# Patient Record
Sex: MALE | Race: WHITE | NOT HISPANIC OR LATINO | Employment: OTHER | URBAN - METROPOLITAN AREA
[De-identification: names, ages, dates, MRNs, and addresses within clinical notes are randomized per-mention and may not be internally consistent; named-entity substitution may affect disease eponyms.]

---

## 2020-10-07 LAB — CRC RECOMMENDATION EXT: NORMAL

## 2023-01-18 ENCOUNTER — OFFICE VISIT (OUTPATIENT)
Dept: PRIMARY CARE CLINIC | Facility: CLINIC | Age: 73
End: 2023-01-18
Payer: MEDICARE

## 2023-01-18 VITALS
HEIGHT: 69 IN | BODY MASS INDEX: 38.51 KG/M2 | WEIGHT: 260 LBS | OXYGEN SATURATION: 95 % | TEMPERATURE: 98 F | RESPIRATION RATE: 18 BRPM

## 2023-01-18 DIAGNOSIS — N40.0 BENIGN PROSTATIC HYPERPLASIA, UNSPECIFIED WHETHER LOWER URINARY TRACT SYMPTOMS PRESENT: ICD-10-CM

## 2023-01-18 DIAGNOSIS — I10 ESSENTIAL HYPERTENSION, BENIGN: ICD-10-CM

## 2023-01-18 DIAGNOSIS — J40 BRONCHITIS: Primary | ICD-10-CM

## 2023-01-18 DIAGNOSIS — J20.8 VIRAL BRONCHITIS: ICD-10-CM

## 2023-01-18 DIAGNOSIS — E78.2 MIXED HYPERLIPIDEMIA: ICD-10-CM

## 2023-01-18 DIAGNOSIS — R73.9 HIGH BLOOD SUGAR: ICD-10-CM

## 2023-01-18 PROCEDURE — 99214 PR OFFICE/OUTPT VISIT, EST, LEVL IV, 30-39 MIN: ICD-10-PCS | Mod: 25,S$GLB,, | Performed by: INTERNAL MEDICINE

## 2023-01-18 PROCEDURE — 96372 THER/PROPH/DIAG INJ SC/IM: CPT | Mod: S$GLB,,, | Performed by: INTERNAL MEDICINE

## 2023-01-18 PROCEDURE — 99214 OFFICE O/P EST MOD 30 MIN: CPT | Mod: 25,S$GLB,, | Performed by: INTERNAL MEDICINE

## 2023-01-18 PROCEDURE — 96372 PR INJECTION,THERAP/PROPH/DIAG2ST, IM OR SUBCUT: ICD-10-PCS | Mod: S$GLB,,, | Performed by: INTERNAL MEDICINE

## 2023-01-18 RX ORDER — METHYLPREDNISOLONE 4 MG/1
TABLET ORAL
Qty: 21 EACH | Refills: 0 | Status: SHIPPED | OUTPATIENT
Start: 2023-01-18 | End: 2023-02-08

## 2023-01-18 RX ORDER — ALBUTEROL SULFATE 90 UG/1
2 AEROSOL, METERED RESPIRATORY (INHALATION) EVERY 6 HOURS PRN
Qty: 18 G | Refills: 0 | Status: SHIPPED | OUTPATIENT
Start: 2023-01-18 | End: 2023-08-01

## 2023-01-18 RX ORDER — BENZONATATE 100 MG/1
100 CAPSULE ORAL 3 TIMES DAILY PRN
Qty: 30 CAPSULE | Refills: 1 | Status: SHIPPED | OUTPATIENT
Start: 2023-01-18 | End: 2023-02-07

## 2023-01-18 NOTE — PROGRESS NOTES
Subjective:       Patient ID: Neil Chatman is a 72 y.o. male.    Chief Complaint: Establish Care and Bronchitis (2 days)      URI   This is a recurrent problem. The current episode started in the past 7 days. The problem has been unchanged. There has been no fever. Associated symptoms include rhinorrhea, a sore throat and wheezing. He has tried nothing for the symptoms.   Review of Systems   HENT:  Positive for rhinorrhea and sore throat.    Respiratory:  Positive for wheezing.        Objective:      Physical Exam  Constitutional:       Appearance: Normal appearance. He is obese.   Cardiovascular:      Rate and Rhythm: Normal rate and regular rhythm.      Pulses: Normal pulses.      Heart sounds: Normal heart sounds. No murmur heard.    No friction rub. No gallop.   Pulmonary:      Effort: Pulmonary effort is normal.      Breath sounds: Wheezing present.      Comments: Dec air entry  Abdominal:      General: Abdomen is flat. Bowel sounds are normal.      Palpations: Abdomen is soft.   Musculoskeletal:         General: Normal range of motion.   Lymphadenopathy:      Cervical: No cervical adenopathy.   Skin:     General: Skin is warm and dry.   Neurological:      General: No focal deficit present.      Mental Status: He is alert and oriented to person, place, and time.   Psychiatric:         Mood and Affect: Mood normal.       Assessment:       1. Bronchitis  -     methylPREDNISolone sod suc(PF) injection 125 mg    2. Viral bronchitis  Overview:  SUBJECTIVE:   Neil Chatman is a 72 y.o. male who complains of sore throat for 2 days. He denies a history of anorexia, chest pain, chills, dizziness and fatigue and denies a history of asthma. Patient denies smoke cigarettes.     OBJECTIVE:  He appears well, vital signs are as noted. Ears normal.  Throat and pharynx normal.  Neck supple. No adenopathy in the neck. Nose is congested. Sinuses non tender. The chest is clear, without wheezes or rales.    ASSESSMENT:   viral upper  respiratory illness    PLAN:  Symptomatic therapy suggested: push fluids, rest, gargle warm salt water, use vaporizer or mist prn and return office visit prn if symptoms persist or worsen                                                    . Lack of antibiotic effectiveness discussed with him. Call or return to clinic prn if these symptoms worsen or fail to improve as anticipated.        Other orders  -     albuterol (VENTOLIN HFA) 90 mcg/actuation inhaler; Inhale 2 puffs into the lungs every 6 (six) hours as needed for Wheezing.  Dispense: 18 g; Refill: 0  -     benzonatate (TESSALON) 100 MG capsule; Take 1 capsule (100 mg total) by mouth 3 (three) times daily as needed for Cough.  Dispense: 30 capsule; Refill: 1  -     methylPREDNISolone (MEDROL DOSEPACK) 4 mg tablet; use as directed  Dispense: 21 each; Refill: 0             Plan:       1. Bronchitis  -     methylPREDNISolone sod suc(PF) injection 125 mg    2. Viral bronchitis  Overview:  SUBJECTIVE:   Neil Chatman is a 72 y.o. male who complains of sore throat for 2 days. He denies a history of anorexia, chest pain, chills, dizziness and fatigue and denies a history of asthma. Patient denies smoke cigarettes.     OBJECTIVE:  He appears well, vital signs are as noted. Ears normal.  Throat and pharynx normal.  Neck supple. No adenopathy in the neck. Nose is congested. Sinuses non tender. The chest is clear, without wheezes or rales.    ASSESSMENT:   viral upper respiratory illness    PLAN:  Symptomatic therapy suggested: push fluids, rest, gargle warm salt water, use vaporizer or mist prn and return office visit prn if symptoms persist or worsen                                                    . Lack of antibiotic effectiveness discussed with him. Call or return to clinic prn if these symptoms worsen or fail to improve as anticipated.        Other orders  -     albuterol (VENTOLIN HFA) 90 mcg/actuation inhaler; Inhale 2 puffs into the lungs every 6 (six) hours as  needed for Wheezing.  Dispense: 18 g; Refill: 0  -     benzonatate (TESSALON) 100 MG capsule; Take 1 capsule (100 mg total) by mouth 3 (three) times daily as needed for Cough.  Dispense: 30 capsule; Refill: 1  -     methylPREDNISolone (MEDROL DOSEPACK) 4 mg tablet; use as directed  Dispense: 21 each; Refill: 0

## 2023-01-19 ENCOUNTER — TELEPHONE (OUTPATIENT)
Dept: FAMILY MEDICINE | Facility: CLINIC | Age: 73
End: 2023-01-19

## 2023-01-19 DIAGNOSIS — N40.0 BENIGN PROSTATIC HYPERPLASIA, UNSPECIFIED WHETHER LOWER URINARY TRACT SYMPTOMS PRESENT: Primary | ICD-10-CM

## 2023-01-19 RX ORDER — ASPIRIN 81 MG/1
TABLET ORAL
COMMUNITY
End: 2024-04-02

## 2023-01-19 RX ORDER — ZINC GLUCONATE 50 MG
50 TABLET ORAL
COMMUNITY
End: 2024-04-02

## 2023-01-19 RX ORDER — SOTALOL HYDROCHLORIDE 80 MG/1
TABLET ORAL
COMMUNITY
End: 2023-04-28 | Stop reason: SDUPTHER

## 2023-01-19 RX ORDER — GABAPENTIN 300 MG/1
CAPSULE ORAL
COMMUNITY
End: 2023-06-05 | Stop reason: SDUPTHER

## 2023-01-19 RX ORDER — VENLAFAXINE HYDROCHLORIDE 150 MG/1
CAPSULE, EXTENDED RELEASE ORAL
COMMUNITY
End: 2023-03-20 | Stop reason: SDUPTHER

## 2023-01-19 RX ORDER — FUROSEMIDE 40 MG/1
TABLET ORAL
COMMUNITY
End: 2024-04-02

## 2023-01-19 RX ORDER — DIAZEPAM 10 MG/1
TABLET ORAL
COMMUNITY
Start: 2022-10-06 | End: 2023-03-20 | Stop reason: SDUPTHER

## 2023-01-19 RX ORDER — POLYETHYLENE GLYCOL 3350, SODIUM CHLORIDE, SODIUM BICARBONATE, POTASSIUM CHLORIDE 420; 11.2; 5.72; 1.48 G/4L; G/4L; G/4L; G/4L
POWDER, FOR SOLUTION ORAL
COMMUNITY

## 2023-01-19 RX ORDER — SODIUM CHLORIDE 0.9 G/100ML
IRRIGANT IRRIGATION
COMMUNITY
End: 2024-04-02

## 2023-01-19 RX ORDER — ACETAMINOPHEN 500 MG
2000 TABLET ORAL
COMMUNITY

## 2023-01-19 RX ORDER — CYCLOSPORINE 0.5 MG/ML
EMULSION OPHTHALMIC
COMMUNITY
End: 2024-04-02

## 2023-01-19 RX ORDER — SULFAMETHOXAZOLE AND TRIMETHOPRIM 800; 160 MG/1; MG/1
TABLET ORAL
COMMUNITY
End: 2024-04-02

## 2023-01-19 RX ORDER — SIMVASTATIN 40 MG/1
TABLET, FILM COATED ORAL
COMMUNITY
End: 2023-02-16 | Stop reason: SDUPTHER

## 2023-01-19 RX ORDER — OMEPRAZOLE 20 MG/1
CAPSULE, DELAYED RELEASE ORAL
COMMUNITY
End: 2023-04-05 | Stop reason: SDUPTHER

## 2023-01-19 RX ORDER — LISINOPRIL 5 MG/1
TABLET ORAL
COMMUNITY
End: 2023-03-02 | Stop reason: SDUPTHER

## 2023-01-19 NOTE — TELEPHONE ENCOUNTER
Leoncio Napier,    I scheduled the appointments for this patient as requested, he is asking can a PSA be added to his lab orders. Also, Mr. Chatman told me to tell you thank you for seeing him on yesterday and that he feels a lot better.  Signed:  Eileen Martinez LPN

## 2023-01-24 PROCEDURE — G0180 MD CERTIFICATION HHA PATIENT: HCPCS | Mod: ,,, | Performed by: INTERNAL MEDICINE

## 2023-01-24 PROCEDURE — G0180 PR HOME HEALTH MD CERTIFICATION: ICD-10-PCS | Mod: ,,, | Performed by: INTERNAL MEDICINE

## 2023-02-16 RX ORDER — SIMVASTATIN 40 MG/1
40 TABLET, FILM COATED ORAL NIGHTLY
Qty: 90 TABLET | Refills: 3 | Status: SHIPPED | OUTPATIENT
Start: 2023-02-16 | End: 2024-04-02 | Stop reason: CLARIF

## 2023-03-02 ENCOUNTER — EXTERNAL HOME HEALTH (OUTPATIENT)
Dept: HOME HEALTH SERVICES | Facility: HOSPITAL | Age: 73
End: 2023-03-02
Payer: MEDICARE

## 2023-03-02 RX ORDER — LISINOPRIL 5 MG/1
5 TABLET ORAL DAILY
Qty: 90 TABLET | Refills: 3 | Status: SHIPPED | OUTPATIENT
Start: 2023-03-02 | End: 2023-04-28 | Stop reason: SDUPTHER

## 2023-03-02 NOTE — TELEPHONE ENCOUNTER
----- Message from Shanice Rosenberg sent at 3/2/2023  9:47 AM CST -----  Contact: pt  Type:  RX Refill Request    Who Called:  pt  Refill or New Rx:  refill  RX Name and Strength:  requesting a multiple refills please    1. lisinopriL (PRINIVIL,ZESTRIL) 5 MG tablet     --  Sig: lisinopril 5 mg tablet      How is the patient currently taking it? (ex. 1XDay):  as order  Is this a 30 day or 90 day RX:  as order  Preferred Pharmacy with phone number:    [id number] - [place], [place] - [address] AT [facility] [address]  [address]  [place] [address]  Phone: [phone] Fax: [phone]      Local or Mail Order:  local  Ordering Provider:  jailene Kohler Call Back Number:  [phone]  Additional Information:

## 2023-03-15 ENCOUNTER — DOCUMENT SCAN (OUTPATIENT)
Dept: HOME HEALTH SERVICES | Facility: HOSPITAL | Age: 73
End: 2023-03-15
Payer: MEDICARE

## 2023-03-17 ENCOUNTER — LAB VISIT (OUTPATIENT)
Dept: LAB | Facility: CLINIC | Age: 73
End: 2023-03-17
Payer: MEDICARE

## 2023-03-17 DIAGNOSIS — I10 ESSENTIAL HYPERTENSION, BENIGN: ICD-10-CM

## 2023-03-17 DIAGNOSIS — E78.2 MIXED HYPERLIPIDEMIA: ICD-10-CM

## 2023-03-17 DIAGNOSIS — R73.9 HIGH BLOOD SUGAR: ICD-10-CM

## 2023-03-17 LAB
ALBUMIN SERPL BCP-MCNC: 3.4 G/DL (ref 3.5–5.2)
ALP SERPL-CCNC: 253 U/L (ref 55–135)
ALT SERPL W/O P-5'-P-CCNC: 10 U/L (ref 10–44)
ANION GAP SERPL CALC-SCNC: 9 MMOL/L (ref 8–16)
AST SERPL-CCNC: 19 U/L (ref 10–40)
BILIRUB SERPL-MCNC: 0.5 MG/DL (ref 0.1–1)
BUN SERPL-MCNC: 9 MG/DL (ref 8–23)
CALCIUM SERPL-MCNC: 9 MG/DL (ref 8.7–10.5)
CHLORIDE SERPL-SCNC: 107 MMOL/L (ref 95–110)
CHOLEST SERPL-MCNC: 152 MG/DL (ref 120–199)
CHOLEST/HDLC SERPL: 3.9 {RATIO} (ref 2–5)
CO2 SERPL-SCNC: 26 MMOL/L (ref 23–29)
COMPLEXED PSA SERPL-MCNC: 1.8 NG/ML (ref 0–4)
CREAT SERPL-MCNC: 0.8 MG/DL (ref 0.5–1.4)
EST. GFR  (NO RACE VARIABLE): >60 ML/MIN/1.73 M^2
ESTIMATED AVG GLUCOSE: 103 MG/DL (ref 68–131)
GLUCOSE SERPL-MCNC: 94 MG/DL (ref 70–110)
HBA1C MFR BLD: 5.2 % (ref 4–5.6)
HDLC SERPL-MCNC: 39 MG/DL (ref 40–75)
HDLC SERPL: 25.7 % (ref 20–50)
LDLC SERPL CALC-MCNC: 88.4 MG/DL (ref 63–159)
NONHDLC SERPL-MCNC: 113 MG/DL
POTASSIUM SERPL-SCNC: 4.6 MMOL/L (ref 3.5–5.1)
PROT SERPL-MCNC: 6.4 G/DL (ref 6–8.4)
SODIUM SERPL-SCNC: 142 MMOL/L (ref 136–145)
TRIGL SERPL-MCNC: 123 MG/DL (ref 30–150)

## 2023-03-17 PROCEDURE — 36415 COLL VENOUS BLD VENIPUNCTURE: CPT | Mod: ,,, | Performed by: STUDENT IN AN ORGANIZED HEALTH CARE EDUCATION/TRAINING PROGRAM

## 2023-03-17 PROCEDURE — 84153 ASSAY OF PSA TOTAL: CPT | Performed by: INTERNAL MEDICINE

## 2023-03-17 PROCEDURE — 80053 COMPREHEN METABOLIC PANEL: CPT | Performed by: INTERNAL MEDICINE

## 2023-03-17 PROCEDURE — 80061 LIPID PANEL: CPT | Performed by: INTERNAL MEDICINE

## 2023-03-17 PROCEDURE — 83036 HEMOGLOBIN GLYCOSYLATED A1C: CPT | Performed by: INTERNAL MEDICINE

## 2023-03-17 PROCEDURE — 36415 PR COLLECTION VENOUS BLOOD,VENIPUNCTURE: ICD-10-PCS | Mod: ,,, | Performed by: STUDENT IN AN ORGANIZED HEALTH CARE EDUCATION/TRAINING PROGRAM

## 2023-03-18 ENCOUNTER — DOCUMENT SCAN (OUTPATIENT)
Dept: HOME HEALTH SERVICES | Facility: HOSPITAL | Age: 73
End: 2023-03-18
Payer: MEDICARE

## 2023-03-20 ENCOUNTER — OFFICE VISIT (OUTPATIENT)
Dept: PRIMARY CARE CLINIC | Facility: CLINIC | Age: 73
End: 2023-03-20
Payer: MEDICARE

## 2023-03-20 VITALS
WEIGHT: 253.13 LBS | DIASTOLIC BLOOD PRESSURE: 78 MMHG | TEMPERATURE: 98 F | BODY MASS INDEX: 37.49 KG/M2 | SYSTOLIC BLOOD PRESSURE: 118 MMHG | RESPIRATION RATE: 16 BRPM | OXYGEN SATURATION: 98 % | HEART RATE: 62 BPM | HEIGHT: 69 IN

## 2023-03-20 DIAGNOSIS — E66.01 MORBID OBESITY: ICD-10-CM

## 2023-03-20 DIAGNOSIS — N40.0 BENIGN PROSTATIC HYPERPLASIA WITHOUT LOWER URINARY TRACT SYMPTOMS: ICD-10-CM

## 2023-03-20 DIAGNOSIS — R74.8 BLOOD ALKALINE PHOSPHATASE INCREASED COMPARED WITH PRIOR MEASUREMENT: ICD-10-CM

## 2023-03-20 DIAGNOSIS — Z85.46 HISTORY OF PROSTATE CANCER: Primary | ICD-10-CM

## 2023-03-20 DIAGNOSIS — R97.21 INCREASING PSA LEVEL AFTER TREATMENT FOR PROSTATE CANCER: ICD-10-CM

## 2023-03-20 DIAGNOSIS — Z85.46 HISTORY OF PROSTATE CANCER: ICD-10-CM

## 2023-03-20 DIAGNOSIS — I42.0 DILATED CARDIOMYOPATHY: ICD-10-CM

## 2023-03-20 DIAGNOSIS — Z11.59 ENCOUNTER FOR HEPATITIS C SCREENING TEST FOR LOW RISK PATIENT: ICD-10-CM

## 2023-03-20 DIAGNOSIS — I48.0 PAROXYSMAL ATRIAL FIBRILLATION: ICD-10-CM

## 2023-03-20 DIAGNOSIS — C61 PROSTATE CANCER: Primary | ICD-10-CM

## 2023-03-20 DIAGNOSIS — J20.8 VIRAL BRONCHITIS: ICD-10-CM

## 2023-03-20 DIAGNOSIS — I10 ESSENTIAL HYPERTENSION, BENIGN: ICD-10-CM

## 2023-03-20 DIAGNOSIS — J44.9 CHRONIC OBSTRUCTIVE PULMONARY DISEASE, UNSPECIFIED COPD TYPE: ICD-10-CM

## 2023-03-20 PROCEDURE — 99213 PR OFFICE/OUTPT VISIT, EST, LEVL III, 20-29 MIN: ICD-10-PCS | Mod: S$GLB,,, | Performed by: INTERNAL MEDICINE

## 2023-03-20 PROCEDURE — 99213 OFFICE O/P EST LOW 20 MIN: CPT | Mod: S$GLB,,, | Performed by: INTERNAL MEDICINE

## 2023-03-20 RX ORDER — DIAZEPAM 10 MG/1
10 TABLET ORAL NIGHTLY
Qty: 30 TABLET | Refills: 0 | Status: SHIPPED | OUTPATIENT
Start: 2023-03-20 | End: 2023-08-01 | Stop reason: SDUPTHER

## 2023-03-20 RX ORDER — FLUTICASONE PROPIONATE AND SALMETEROL 250; 50 UG/1; UG/1
1 POWDER RESPIRATORY (INHALATION) 2 TIMES DAILY
COMMUNITY

## 2023-03-20 RX ORDER — VENLAFAXINE HYDROCHLORIDE 150 MG/1
150 CAPSULE, EXTENDED RELEASE ORAL DAILY
Qty: 90 CAPSULE | Refills: 3 | Status: SHIPPED | OUTPATIENT
Start: 2023-03-20 | End: 2023-06-27 | Stop reason: SDUPTHER

## 2023-03-20 NOTE — PROGRESS NOTES
Subjective:       Patient ID: Neil Chatman is a 72 y.o. male.    Chief Complaint: Follow-up (Routine)      Patient is established already .......... presents today for a f/u visit , to discuss labs results and for management of the chronic conditions.        Follow-up  Associated symptoms include coughing.   Cough  This is a recurrent problem. The current episode started more than 1 year ago. The problem has been waxing and waning. The problem occurs every few hours. The cough is Non-productive. Associated symptoms include shortness of breath. Associated symptoms comments: With a viral inf. Exacerbated by: viral URI. He has tried a beta-agonist inhaler and steroid inhaler for the symptoms. The treatment provided significant relief. His past medical history is significant for emphysema.   Review of Systems   Respiratory:  Positive for cough and shortness of breath.        Objective:      Physical Exam  Vitals and nursing note reviewed.   Constitutional:       Appearance: Normal appearance. He is obese.   Cardiovascular:      Rate and Rhythm: Normal rate and regular rhythm.      Pulses: Normal pulses.      Heart sounds: Normal heart sounds. No murmur heard.    No friction rub. No gallop.   Pulmonary:      Effort: Pulmonary effort is normal.      Breath sounds: Normal breath sounds. No wheezing.   Abdominal:      General: Abdomen is flat. Bowel sounds are normal.      Palpations: Abdomen is soft.   Musculoskeletal:         General: Normal range of motion.   Skin:     General: Skin is warm and dry.   Neurological:      General: No focal deficit present.      Mental Status: He is alert and oriented to person, place, and time.   Psychiatric:         Mood and Affect: Mood normal.       Assessment:       1. Prostate cancer    2. Morbid obesity    3. Dilated cardiomyopathy    4. Paroxysmal atrial fibrillation    5. Essential hypertension, benign  -     Comprehensive Metabolic Panel; Future; Expected date: 03/20/2023    6.  Benign prostatic hyperplasia without lower urinary tract symptoms    7. History of prostate cancer  -     Prostate Specific Antigen, Diagnostic; Future; Expected date: 03/20/2023    8. Chronic obstructive pulmonary disease, unspecified COPD type    9. Viral bronchitis  Overview:  SUBJECTIVE:   Neil Chatman is a 72 y.o. male who complains of sore throat for 2 days. He denies a history of anorexia, chest pain, chills, dizziness and fatigue and denies a history of asthma. Patient denies smoke cigarettes.     OBJECTIVE:  He appears well, vital signs are as noted. Ears normal.  Throat and pharynx normal.  Neck supple. No adenopathy in the neck. Nose is congested. Sinuses non tender. The chest is clear, without wheezes or rales.    ASSESSMENT:   viral upper respiratory illness    PLAN:  Symptomatic therapy suggested: push fluids, rest, gargle warm salt water, use vaporizer or mist prn and return office visit prn if symptoms persist or worsen                                                    . Lack of antibiotic effectiveness discussed with him. Call or return to clinic prn if these symptoms worsen or fail to improve as anticipated.      Assessment & Plan:  Improved  F/u with Pulmonary re maintenance Inhaler Rx, dosing      10. Increasing PSA level after treatment for prostate cancer  Overview:  PSA inc from 0.7 to 1.8  Send info to Dr Guerrero  Recheck in 6M      11. Blood alkaline phosphatase increased compared with prior measurement  Overview:  Possibly sec to recent knee surgery  R/o Pagets disease    Assessment & Plan:  Monitor  Recheck CMP in 6M      Other orders  -     venlafaxine (EFFEXOR-XR) 150 MG Cp24; Take 1 capsule (150 mg total) by mouth once daily.  Dispense: 90 capsule; Refill: 3             Plan:       1. Prostate cancer    2. Morbid obesity    3. Dilated cardiomyopathy    4. Paroxysmal atrial fibrillation    5. Essential hypertension, benign  -     Comprehensive Metabolic Panel; Future; Expected date:  03/20/2023    6. Benign prostatic hyperplasia without lower urinary tract symptoms    7. History of prostate cancer  -     Prostate Specific Antigen, Diagnostic; Future; Expected date: 03/20/2023    8. Chronic obstructive pulmonary disease, unspecified COPD type    9. Viral bronchitis  Overview:  SUBJECTIVE:   Neil Chatman is a 72 y.o. male who complains of sore throat for 2 days. He denies a history of anorexia, chest pain, chills, dizziness and fatigue and denies a history of asthma. Patient denies smoke cigarettes.     OBJECTIVE:  He appears well, vital signs are as noted. Ears normal.  Throat and pharynx normal.  Neck supple. No adenopathy in the neck. Nose is congested. Sinuses non tender. The chest is clear, without wheezes or rales.    ASSESSMENT:   viral upper respiratory illness    PLAN:  Symptomatic therapy suggested: push fluids, rest, gargle warm salt water, use vaporizer or mist prn and return office visit prn if symptoms persist or worsen                                                    . Lack of antibiotic effectiveness discussed with him. Call or return to clinic prn if these symptoms worsen or fail to improve as anticipated.      Assessment & Plan:  Improved  F/u with Pulmonary re maintenance Inhaler Rx, dosing      10. Increasing PSA level after treatment for prostate cancer  Overview:  PSA inc from 0.7 to 1.8  Send info to Dr Guerrero  Recheck in 6M      11. Blood alkaline phosphatase increased compared with prior measurement  Overview:  Possibly sec to recent knee surgery  R/o Pagets disease    Assessment & Plan:  Monitor  Recheck CMP in 6M      Other orders  -     venlafaxine (EFFEXOR-XR) 150 MG Cp24; Take 1 capsule (150 mg total) by mouth once daily.  Dispense: 90 capsule; Refill: 3

## 2023-03-24 ENCOUNTER — TELEPHONE (OUTPATIENT)
Dept: PRIMARY CARE CLINIC | Facility: CLINIC | Age: 73
End: 2023-03-24
Payer: MEDICARE

## 2023-03-24 NOTE — TELEPHONE ENCOUNTER
Patient is unable to locate his previous urologist and is requesting a referral to Dr. Bulmaro Guerrero.

## 2023-03-24 NOTE — TELEPHONE ENCOUNTER
----- Message from Dulce Villar sent at 3/24/2023  2:40 PM CDT -----  Contact: Patient  Type:  Patient Requesting Referral    Who Called:Patient     Does the patient already have the specialty appointment scheduled?:No    If yes, what is the date of that appointment?:NA    Referral to What Specialty:Urology     Reason for Referral:prostate issues    Does the patient want the referral with a specific physician?:  Bulmaro Guerrero ( If he currently practices)    [facility] the [facility]    Patient Requesting a Response?:Yes    Would the patient rather a call back or a response via [facility]? Call    Best Call Back Number:[phone] (home)     Additional Information: Patient is requesting a Urology appt. Patient has not been able to locate his previous urologist

## 2023-03-28 NOTE — TELEPHONE ENCOUNTER
Left message on patient's voice mail re: Dr. Napier would like to know who his previous urologist was and the reason he is requesting the referral.

## 2023-04-20 ENCOUNTER — TELEPHONE (OUTPATIENT)
Dept: FAMILY MEDICINE | Facility: CLINIC | Age: 73
End: 2023-04-20
Payer: MEDICARE

## 2023-04-20 NOTE — TELEPHONE ENCOUNTER
----- Message from Jose Perez MD sent at 3/20/2023  2:15 PM CDT -----  Regarding: Labs, PSA  Please fax labs,PSA result to his Urologist, Dr KATERINA Guerrero in [place]  Ty

## 2023-04-20 NOTE — TELEPHONE ENCOUNTER
----- Message from Jose Perez MD sent at 3/20/2023  7:46 PM CDT -----  Regarding: Labs  Pls sched him for labs here before next visit , in 3-6 months  Ty

## 2023-04-24 ENCOUNTER — OFFICE VISIT (OUTPATIENT)
Dept: PRIMARY CARE CLINIC | Facility: CLINIC | Age: 73
End: 2023-04-24
Payer: MEDICARE

## 2023-04-24 VITALS
BODY MASS INDEX: 37.5 KG/M2 | OXYGEN SATURATION: 95 % | WEIGHT: 253.19 LBS | TEMPERATURE: 98 F | RESPIRATION RATE: 16 BRPM | DIASTOLIC BLOOD PRESSURE: 80 MMHG | HEIGHT: 69 IN | SYSTOLIC BLOOD PRESSURE: 126 MMHG | HEART RATE: 74 BPM

## 2023-04-24 DIAGNOSIS — Z79.899 DRUG THERAPY: ICD-10-CM

## 2023-04-24 DIAGNOSIS — G45.9 TRANSIENT CEREBRAL ISCHEMIA, UNSPECIFIED TYPE: Primary | ICD-10-CM

## 2023-04-24 DIAGNOSIS — G45.9 TIA (TRANSIENT ISCHEMIC ATTACK): ICD-10-CM

## 2023-04-24 DIAGNOSIS — I48.91 ATRIAL FIBRILLATION, UNSPECIFIED TYPE: ICD-10-CM

## 2023-04-24 PROCEDURE — 99215 PR OFFICE/OUTPT VISIT, EST, LEVL V, 40-54 MIN: ICD-10-PCS | Mod: S$GLB,,, | Performed by: INTERNAL MEDICINE

## 2023-04-24 PROCEDURE — 93005 EKG 12-LEAD: ICD-10-PCS | Mod: S$GLB,,, | Performed by: INTERNAL MEDICINE

## 2023-04-24 PROCEDURE — 93010 ELECTROCARDIOGRAM REPORT: CPT | Mod: S$PBB,,, | Performed by: INTERNAL MEDICINE

## 2023-04-24 PROCEDURE — 93005 ELECTROCARDIOGRAM TRACING: CPT | Mod: S$GLB,,, | Performed by: INTERNAL MEDICINE

## 2023-04-24 PROCEDURE — 99215 OFFICE O/P EST HI 40 MIN: CPT | Mod: S$GLB,,, | Performed by: INTERNAL MEDICINE

## 2023-04-24 PROCEDURE — 93010 EKG 12-LEAD: ICD-10-PCS | Mod: S$PBB,,, | Performed by: INTERNAL MEDICINE

## 2023-04-24 RX ORDER — UMECLIDINIUM 62.5 UG/1
1 AEROSOL, POWDER ORAL
COMMUNITY
Start: 2023-01-26 | End: 2024-01-04 | Stop reason: CLARIF

## 2023-04-24 NOTE — ASSESSMENT & PLAN NOTE
Possible TIA  RULE OUT CVA  I am going to refer him to Neurology  It will be difficult to get an MRI of the brain because of his pacemaker  I will order CT scan of the brain with contrast  Decrease aspirin to baby aspirin for the time being until we rule out a bleed  Referral to Cardiology for possible RAMEZ, given his history of atrial fibrillation  Order a carotid ultrasound

## 2023-04-24 NOTE — PROGRESS NOTES
Subjective:       Patient ID: Neil Chatman is a 72 y.o. male.    Chief Complaint: TIA Episode (Pt reports happened Saturday AM. Had difficulty choosing words.) and Speech Problem (Had an episode of diff with finding right words few days ago that lasted about 24 hrs)      Patient is established already .......... presents today for a possible TIA versus CVA  Had difficulty finding the right words for about 24 hour........  That happened about 2-3 days ago  However he denied any tingling numbness of the extremities he denied any weakness of his arms or legs  He denied any headaches nausea vomiting diplopia  Denied any dizziness confusion even though his family has been reporting recent memory lapses    Review of Systems   Constitutional:  Negative for activity change, appetite change and fever.   Respiratory: Negative.     Cardiovascular: Negative.    Gastrointestinal: Negative.    Musculoskeletal: Negative.    Neurological:  Positive for speech difficulty and memory loss. Negative for dizziness, tremors, syncope, facial asymmetry, numbness and headaches.        Trouble finding right words for 24 hrs       Objective:      Physical Exam  Vitals and nursing note reviewed.   Constitutional:       Appearance: Normal appearance. He is obese.      Comments: Walking with difficulty after his knee replacement   Cardiovascular:      Rate and Rhythm: Normal rate and regular rhythm.      Pulses: Normal pulses.      Heart sounds: Normal heart sounds. No murmur heard.    No friction rub. No gallop.   Pulmonary:      Effort: Pulmonary effort is normal.      Breath sounds: Normal breath sounds. No wheezing.   Abdominal:      General: Abdomen is flat. Bowel sounds are normal.      Palpations: Abdomen is soft.   Musculoskeletal:         General: Normal range of motion.   Skin:     General: Skin is warm and dry.   Neurological:      General: No focal deficit present.      Mental Status: He is alert and oriented to person, place, and  time.   Psychiatric:         Mood and Affect: Mood normal.       Assessment:       1. Transient cerebral ischemia, unspecified type  -     CT Head With Contrast; Future; Expected date: 04/24/2023  -     Ambulatory referral/consult to Cardiology; Future; Expected date: 05/01/2023    2. TIA (transient ischemic attack)  Overview:  With trouble finding right words for 24 hrs  Resolved     Assessment & Plan:  Possible TIA  RULE OUT CVA  I am going to refer him to Neurology  It will be difficult to get an MRI of the brain because of his pacemaker  I will order CT scan of the brain with contrast  Decrease aspirin to baby aspirin for the time being until we rule out a bleed  Referral to Cardiology for possible RAMEZ, given his history of atrial fibrillation  Order a carotid ultrasound    Orders:  -     CV Ultrasound Bilateral Doppler Carotid; Future  -     CT Head With Contrast; Future; Expected date: 04/24/2023  -     Ambulatory referral/consult to Cardiology; Future; Expected date: 05/01/2023    3. Drug therapy  -     Creatinine, serum; Future; Expected date: 04/24/2023    4. Atrial fibrillation, unspecified type  -     Ambulatory referral/consult to Cardiology; Future; Expected date: 05/01/2023  -     IN OFFICE EKG 12-LEAD (to Byron)             Plan:       1. Transient cerebral ischemia, unspecified type  -     CT Head With Contrast; Future; Expected date: 04/24/2023  -     Ambulatory referral/consult to Cardiology; Future; Expected date: 05/01/2023    2. TIA (transient ischemic attack)  Overview:  With trouble finding right words for 24 hrs  Resolved     Assessment & Plan:  Possible TIA  RULE OUT CVA  I am going to refer him to Neurology  It will be difficult to get an MRI of the brain because of his pacemaker  I will order CT scan of the brain with contrast  Decrease aspirin to baby aspirin for the time being until we rule out a bleed  Referral to Cardiology for possible RAMEZ, given his history of atrial  fibrillation  Order a carotid ultrasound    Orders:  -     CV Ultrasound Bilateral Doppler Carotid; Future  -     CT Head With Contrast; Future; Expected date: 04/24/2023  -     Ambulatory referral/consult to Cardiology; Future; Expected date: 05/01/2023    3. Drug therapy  -     Creatinine, serum; Future; Expected date: 04/24/2023    4. Atrial fibrillation, unspecified type  -     Ambulatory referral/consult to Cardiology; Future; Expected date: 05/01/2023  -     IN OFFICE EKG 12-LEAD (to Muse)    I spent a total of 45 minutes on the day of the visit.This includes face to face time and non-face to face time preparing to see the patient (eg, review of tests), obtaining and/or reviewing separately obtained history, documenting clinical information in the electronic or other health record, independently interpreting results and communicating results to the patient/family/caregiver, or care coordinator.

## 2023-04-25 ENCOUNTER — TELEPHONE (OUTPATIENT)
Dept: LAB | Facility: HOSPITAL | Age: 73
End: 2023-04-25
Payer: MEDICARE

## 2023-04-25 NOTE — TELEPHONE ENCOUNTER
----- Message from Cynthia Yonis sent at 4/25/2023  2:26 PM CDT -----  Contact: PT  Type:  Patient Returning Call    Who Called:PT  Who Left Message for Patient: N/A  Does the patient know what this is regarding?: REFERRAL   Would the patient rather a call back or a response via [facility]? CALL   Best Call Back Number:[phone] (home)     Additional Information: PT REQUEST A CALL BACK TO ALSO DISCUSS HIS REFERRAL EXPERIENCE

## 2023-04-25 NOTE — TELEPHONE ENCOUNTER
----- Message from Sj Berhane sent at 4/25/2023  9:44 AM CDT -----  Regarding: referral  Contact: Guru at [phone]  Type:  Patient Requesting Referral    Who Called:  guru  Does the patient already have the specialty appointment scheduled?:  no  If yes, what is the date of that appointment?:  n/a    Referral to What Specialty:  NEURO  Reason for Referral:  G45.9 (ICD-10-CM) - Transient cerebral ischemia, unspecified type    Does the patient want the referral with a specific physician?:  no  Is the specialist [facility]?:  [facility]    Patient Requesting a Call Back?:  yes  Best Call Back Number:  [phone]    Additional Information:   PT wants to go to [facility] [address], [place] [address], phone [phone].     [facility]. [facility] not [facility]. Please sent NEW Referral to [facility] and Call PT when sent so pt can call to schedule.

## 2023-04-25 NOTE — TELEPHONE ENCOUNTER
----- Message from Dulce Villar sent at 4/25/2023  3:02 PM CDT -----  Contact: [facility]  Type:  Needs Medical Advice    Who Called: Anastasia Gama      Would the patient rather a call back or a response via [facility]? Call/ Fax     Best Call Back Number: [phone]    fax number [phone]    Additional Information: [facility] tried [facility] call [facility] for [facility]t but doesn't have orders. [facility] has not received orders. Please advise. CT scan and US orders are whats needed

## 2023-04-26 ENCOUNTER — TELEPHONE (OUTPATIENT)
Dept: FAMILY MEDICINE | Facility: CLINIC | Age: 73
End: 2023-04-26
Payer: MEDICARE

## 2023-04-26 ENCOUNTER — TELEPHONE (OUTPATIENT)
Dept: PRIMARY CARE CLINIC | Facility: CLINIC | Age: 73
End: 2023-04-26
Payer: MEDICARE

## 2023-04-26 NOTE — TELEPHONE ENCOUNTER
Leoncio Tran,  Please review message below.  Call placed to patient due to orders for CT Scan to be done ASAP. Faxed orders to [facility]. Contact the patient states sick today and unable to go today will keep scheduled appt for 5/1/2023 @ 12:30 in [facility].  Thank you.  Signed:  Eilene Martinez LPN    ----- Message from Jose Perez MD sent at 4/24/2023  7:21 PM CDT -----  Regarding: RE: Neil Edmonds MRN# [id number]  Contact: KARLA, WIFE  I placed CT orders today.. Pls make sure its faxed to [facility] and call them to see if they can squeeze him fast : [phone]  ----- Message -----  From: Eileen Martinez LPN  Sent: 4/24/2023   5:14 PM CDT  To: Jose Perez MD  Subject: Neil Edmonds MRN# [id number]                        Leoncio Napier,  Please review message below.  Thank you  Signed:  Eileen Martinez LPN  ----- Message -----  From: Cynthia Somers  Sent: 4/24/2023   4:24 PM CDT  To: Puneet Garcia Staff    Type:  Needs Medical Advice    Who Called: KARLA, WIFE   Symptoms (please be specific): CT SCAN ORDER NEEDED ASAP   Would the patient rather a call back or a response via [facility]?  CALL   Best Call Back Number: [phone] (home) / [phone]  Additional Information: DR. JAVIER FOSTER  CAN NOT SEE PT UNTIL NEXT MONTH. PT NEEDS THE CT SCAN ASAP. PLEASE CALL PT TO DISCUSS. THANK YOU

## 2023-04-26 NOTE — TELEPHONE ENCOUNTER
----- Message from Cynthia Yonis sent at 4/25/2023  3:43 PM CDT -----  Contact: POLY BELLA  Type:  Patient Returning Call    Who Called:POLY BELLA   Who Left Message for Patient:LETHA  Does the patient know what this is regarding?:N/A  Would the patient rather a call back or a response via [facility]? CALL  Best Call Back Number:[phone]   Additional Information: THANK YOU

## 2023-04-28 RX ORDER — SOTALOL HYDROCHLORIDE 80 MG/1
TABLET ORAL
Qty: 90 TABLET | Refills: 3 | Status: SHIPPED | OUTPATIENT
Start: 2023-04-28 | End: 2023-06-27 | Stop reason: SDUPTHER

## 2023-04-28 RX ORDER — LISINOPRIL 5 MG/1
5 TABLET ORAL DAILY
Qty: 90 TABLET | Refills: 3 | Status: SHIPPED | OUTPATIENT
Start: 2023-04-28 | End: 2023-05-09 | Stop reason: SDUPTHER

## 2023-04-28 NOTE — TELEPHONE ENCOUNTER
----- Message from Dulce Villar sent at 4/28/2023 11:00 AM CDT -----  Contact: Patient        Type:  RX Refill Request    Who Called: Patient     Refill or New Rx:refill     RX Name and Strength:       sotaloL (BETAPACE) 80 MG tablet    1 x day     lisinopriL (PRINIVIL,ZESTRIL) 5 MG tablet     2 x day    How is the patient currently taking it? (ex. 1XDay):multiple    Is this a 30 day or 90 day RX:90    Preferred Pharmacy with phone number:  [facility][id number] - [place], [place] - [address] [place] [facility] [address]  [address]  [place] [address]  Phone: [phone] Fax: [phone]      Local or Mail Order:Local    Ordering Provider:Karthikeyan    Would the patient rather a call back or a response via [facility]? Call    Best Call Back Number:[phone] ([place])     Additional Information: Please refill and call if needed to advise

## 2023-04-29 RX ORDER — PROMETHAZINE HYDROCHLORIDE 25 MG/1
25 TABLET ORAL EVERY 6 HOURS PRN
Qty: 30 TABLET | Refills: 0 | Status: SHIPPED | OUTPATIENT
Start: 2023-04-29 | End: 2023-05-29

## 2023-05-09 RX ORDER — LISINOPRIL 5 MG/1
5 TABLET ORAL DAILY
Qty: 90 TABLET | Refills: 3 | Status: SHIPPED | OUTPATIENT
Start: 2023-05-09 | End: 2024-05-08

## 2023-05-09 NOTE — TELEPHONE ENCOUNTER
Last Office Visit: 4/24/2023    Call placed to patient due to orders from Dr. Napier regarding medication refills. Patient currently not available message left for return call.

## 2023-05-25 DIAGNOSIS — R47.9 SPEECH DISTURBANCE, UNSPECIFIED TYPE: ICD-10-CM

## 2023-05-25 DIAGNOSIS — G45.9 TIA (TRANSIENT ISCHEMIC ATTACK): Primary | ICD-10-CM

## 2023-06-05 RX ORDER — GABAPENTIN 300 MG/1
300 CAPSULE ORAL NIGHTLY
Qty: 90 CAPSULE | Refills: 3 | Status: SHIPPED | OUTPATIENT
Start: 2023-06-05 | End: 2024-06-04

## 2023-06-05 NOTE — TELEPHONE ENCOUNTER
----- Message from Sonali Barragan sent at 6/5/2023 11:46 AM CDT -----  Regarding: med refill  Can the clinic reply in [facility]:       Please refill the medication(s) listed below.       Please call the patient when the prescription(s) is ready for  at this phone number:         Medication #1 gabapentin (NEURONTIN) 300 MG capsule     1 tab twice a day quantity 180    Medication #2     Preferred Pharmacy:   [facility][id number] - [place], [place] - [address] AT [facility] [address]  [address]  [place] [address]  Phone: [phone] Fax: [phone]

## 2023-06-27 ENCOUNTER — TELEPHONE (OUTPATIENT)
Dept: PRIMARY CARE CLINIC | Facility: CLINIC | Age: 73
End: 2023-06-27
Payer: MEDICARE

## 2023-06-27 RX ORDER — VENLAFAXINE HYDROCHLORIDE 150 MG/1
150 CAPSULE, EXTENDED RELEASE ORAL DAILY
Qty: 90 CAPSULE | Refills: 3 | Status: SHIPPED | OUTPATIENT
Start: 2023-06-27 | End: 2024-06-26

## 2023-06-27 RX ORDER — OMEPRAZOLE 20 MG/1
20 CAPSULE, DELAYED RELEASE ORAL DAILY
Qty: 90 CAPSULE | Refills: 3 | Status: SHIPPED | OUTPATIENT
Start: 2023-06-27 | End: 2024-06-26

## 2023-06-27 RX ORDER — SOTALOL HYDROCHLORIDE 80 MG/1
TABLET ORAL
Qty: 90 TABLET | Refills: 3 | Status: SHIPPED | OUTPATIENT
Start: 2023-06-27 | End: 2023-06-28 | Stop reason: SDUPTHER

## 2023-06-27 NOTE — TELEPHONE ENCOUNTER
----- Message from Eliseo Dunn sent at 6/27/2023 11:45 AM CDT -----  Contact: pt  Type:  RX Refill Request    Who Called:  pt   Refill or New Rx:  refill  RX Name and Strength:  venlafaxine (EFFEXOR-XR) 150 MG Cp24 and sotaloL (BETAPACE) 80 MG tablet , omeprazole (PRILOSEC) 20 MG capsule   How is the patient currently taking it? (ex. 1XDay):  1 x day   Is this a 30 day or 90 day RX:  90  Preferred Pharmacy with phone number:    [facility][id number] - [place], [place] - [address] AT [facility] [address]  [address]  [place] [address]  Phone: [phone] Fax: [phone]          Local or Mail Order:  local  Ordering Provider:  Dr. Jose Kohler Call Back Number:  [phone]    Additional Information:  pt would like a refill. Please advise.

## 2023-06-27 NOTE — TELEPHONE ENCOUNTER
----- Message from Eliseo Dunn sent at 6/27/2023 11:45 AM CDT -----  Contact: pt  Type:  RX Refill Request    Who Called:  pt   Refill or New Rx:  refill  RX Name and Strength:  venlafaxine (EFFEXOR-XR) 150 MG Cp24 and sotaloL (BETAPACE) 80 MG tablet , omeprazole (PRILOSEC) 20 MG capsule   How is the patient currently taking it? (ex. 1XDay):  1 x day   Is this a 30 day or 90 day RX:  90  Preferred Pharmacy with phone number:    [facility] #[id number] - [place], [place] - [address] AT [facility] [address]  [address]  [place] [address]  Phone: [phone] Fax: [phone]          Local or Mail Order:  local  Ordering Provider:  Dr. Jose Kohler Call Back Number:  [phone]    Additional Information:  pt would like a refill. Please advise.

## 2023-06-28 ENCOUNTER — TELEPHONE (OUTPATIENT)
Dept: FAMILY MEDICINE | Facility: CLINIC | Age: 73
End: 2023-06-28

## 2023-06-28 ENCOUNTER — TELEPHONE (OUTPATIENT)
Dept: FAMILY MEDICINE | Facility: CLINIC | Age: 73
End: 2023-06-28
Payer: MEDICARE

## 2023-06-28 RX ORDER — SOTALOL HYDROCHLORIDE 80 MG/1
80 TABLET ORAL 2 TIMES DAILY
Qty: 180 TABLET | Refills: 3 | Status: SHIPPED | OUTPATIENT
Start: 2023-06-28 | End: 2024-06-27

## 2023-06-28 NOTE — TELEPHONE ENCOUNTER
Call placed to patient due to orders from Dr. Napier. Spoke with patient informed him that  sent that Rx to [facility] on Friday.  MG  OK pls tell him that sent the refill to [facility]        Leoncio Napier,  Please review message below.  Call placed to patient due to 's questions who prescribed this medication. Spoke with patient states he started using this cream due to skin irritation. Informed patient that Dr. Napier needs to evaluate those areas so we need to schedule appt.  Patient states he has an appointment on August 1st and he can evaluated it then.  Thank you.  Signed:  NEVA Villagran MD Carlinda Rashid, LPN  Caller: Unspecified (2 days ago,  1:33 PM)  Whats that for ? Did I ever prescribed it to him before ?   Ty               Last Office Visit: 4/24/2023      ----- Message from Cynthia Somers sent at 6/28/2023  1:16 PM CDT -----  Contact: PT  Type:  RX Refill Request    Who Called: PT  Refill or New Rx:REFILL  RX Name and Strength:LIDEX CREAM  How is the patient currently taking it? (ex. 1XDay):AS DIRECTED   Is this a 30 day or 90 day RX: 90  Preferred Pharmacy with phone number:  [facility] #[id number] - [place], [place] - [address] AT [facility] [address]  [address]  [place] [address]  Phone: [phone] Fax: [phone]  Local or Mail Order:LOCAL  Ordering Provider:TYLER  Would the patient rather a call back or a response via [facility]? CALL   Best Call Back Number:[phone] (home)   Additional Information: THANK YOU

## 2023-06-28 NOTE — TELEPHONE ENCOUNTER
----- Message from Ronaldo Harp sent at 6/28/2023  1:29 PM CDT -----  Type: Needs Medical Advice  Who Called:  pt  Pharmacy name and phone #:    [facility][id number] - [place], [place] - [address] [place] [facility] [address]  [address]  [place] [address]  Phone: [phone] Fax: [phone]  Best Call Back Number: [phone]  Additional Information: pt is calling the office in regards to his Rx for sotaloL (BETAPACE) 80 MG tablet. This medication is supposed to be 1 PO 2XDay. The pt is requesting a new Rx be sent in as the one sent in before is not correct and his insurance provider denied the medication. Please call back to advise. Thanks!

## 2023-06-29 ENCOUNTER — PATIENT MESSAGE (OUTPATIENT)
Dept: ADMINISTRATIVE | Facility: HOSPITAL | Age: 73
End: 2023-06-29
Payer: MEDICARE

## 2023-06-30 RX ORDER — FLUOCINONIDE CREAM (EMULSIFIED BASE) 0.5 MG/G
CREAM TOPICAL 2 TIMES DAILY
Qty: 30 G | Refills: 0 | Status: SHIPPED | OUTPATIENT
Start: 2023-06-30 | End: 2023-08-01

## 2023-07-24 PROBLEM — G45.9 TIA (TRANSIENT ISCHEMIC ATTACK): Status: RESOLVED | Noted: 2023-04-24 | Resolved: 2023-07-24

## 2023-07-31 ENCOUNTER — PATIENT MESSAGE (OUTPATIENT)
Dept: PRIMARY CARE CLINIC | Facility: CLINIC | Age: 73
End: 2023-07-31
Payer: MEDICARE

## 2023-07-31 ENCOUNTER — LAB VISIT (OUTPATIENT)
Dept: FAMILY MEDICINE | Facility: CLINIC | Age: 73
End: 2023-07-31
Payer: MEDICARE

## 2023-07-31 DIAGNOSIS — I10 ESSENTIAL HYPERTENSION, BENIGN: ICD-10-CM

## 2023-07-31 DIAGNOSIS — Z11.59 ENCOUNTER FOR HEPATITIS C SCREENING TEST FOR LOW RISK PATIENT: ICD-10-CM

## 2023-07-31 DIAGNOSIS — Z85.46 HISTORY OF PROSTATE CANCER: ICD-10-CM

## 2023-07-31 LAB
ALBUMIN SERPL BCP-MCNC: 3.2 G/DL (ref 3.5–5.2)
ALP SERPL-CCNC: 241 U/L (ref 55–135)
ALT SERPL W/O P-5'-P-CCNC: 20 U/L (ref 10–44)
ANION GAP SERPL CALC-SCNC: 8 MMOL/L (ref 8–16)
AST SERPL-CCNC: 23 U/L (ref 10–40)
BILIRUB SERPL-MCNC: 0.6 MG/DL (ref 0.1–1)
BUN SERPL-MCNC: 11 MG/DL (ref 8–23)
CALCIUM SERPL-MCNC: 9.1 MG/DL (ref 8.7–10.5)
CHLORIDE SERPL-SCNC: 107 MMOL/L (ref 95–110)
CO2 SERPL-SCNC: 25 MMOL/L (ref 23–29)
COMPLEXED PSA SERPL-MCNC: 2.3 NG/ML (ref 0–4)
CREAT SERPL-MCNC: 0.8 MG/DL (ref 0.5–1.4)
EST. GFR  (NO RACE VARIABLE): >60 ML/MIN/1.73 M^2
GLUCOSE SERPL-MCNC: 79 MG/DL (ref 70–110)
HCV AB SERPL QL IA: NORMAL
POTASSIUM SERPL-SCNC: 4.2 MMOL/L (ref 3.5–5.1)
PROT SERPL-MCNC: 5.9 G/DL (ref 6–8.4)
SODIUM SERPL-SCNC: 140 MMOL/L (ref 136–145)

## 2023-07-31 PROCEDURE — 86803 HEPATITIS C AB TEST: CPT | Performed by: INTERNAL MEDICINE

## 2023-07-31 PROCEDURE — 80053 COMPREHEN METABOLIC PANEL: CPT | Performed by: INTERNAL MEDICINE

## 2023-07-31 PROCEDURE — 84153 ASSAY OF PSA TOTAL: CPT | Performed by: INTERNAL MEDICINE

## 2023-08-01 ENCOUNTER — OFFICE VISIT (OUTPATIENT)
Dept: PRIMARY CARE CLINIC | Facility: CLINIC | Age: 73
End: 2023-08-01
Payer: MEDICARE

## 2023-08-01 ENCOUNTER — PATIENT OUTREACH (OUTPATIENT)
Dept: ADMINISTRATIVE | Facility: HOSPITAL | Age: 73
End: 2023-08-01
Payer: MEDICARE

## 2023-08-01 VITALS
WEIGHT: 243 LBS | OXYGEN SATURATION: 97 % | HEART RATE: 68 BPM | SYSTOLIC BLOOD PRESSURE: 124 MMHG | HEIGHT: 69 IN | TEMPERATURE: 98 F | BODY MASS INDEX: 35.99 KG/M2 | RESPIRATION RATE: 18 BRPM | DIASTOLIC BLOOD PRESSURE: 76 MMHG

## 2023-08-01 DIAGNOSIS — Z00.00 ENCOUNTER FOR ANNUAL WELLNESS VISIT (AWV) IN MEDICARE PATIENT: ICD-10-CM

## 2023-08-01 PROCEDURE — G0439 PPPS, SUBSEQ VISIT: HCPCS | Mod: S$GLB,,, | Performed by: INTERNAL MEDICINE

## 2023-08-01 PROCEDURE — G0439 PR MEDICARE ANNUAL WELLNESS SUBSEQUENT VISIT: ICD-10-PCS | Mod: S$GLB,,, | Performed by: INTERNAL MEDICINE

## 2023-08-01 RX ORDER — GENTAMICIN SULFATE 1 MG/G
OINTMENT TOPICAL
COMMUNITY
End: 2024-04-02

## 2023-08-01 RX ORDER — FLUOCINONIDE 0.5 MG/G
CREAM TOPICAL
COMMUNITY

## 2023-08-01 RX ORDER — ERTAPENEM 1 G/1
INJECTION, POWDER, LYOPHILIZED, FOR SOLUTION INTRAMUSCULAR; INTRAVENOUS
COMMUNITY
End: 2024-04-02

## 2023-08-01 RX ORDER — OXYCODONE HYDROCHLORIDE 10 MG/1
TABLET ORAL
COMMUNITY

## 2023-08-01 RX ORDER — DOCUSATE SODIUM 100 MG/1
100 CAPSULE, LIQUID FILLED ORAL
COMMUNITY
Start: 2023-02-09

## 2023-08-01 RX ORDER — DIAZEPAM 10 MG/1
10 TABLET ORAL DAILY PRN
Qty: 30 TABLET | Refills: 2 | Status: SHIPPED | OUTPATIENT
Start: 2023-08-01 | End: 2023-10-30

## 2023-08-01 RX ORDER — MELOXICAM 15 MG/1
TABLET ORAL
COMMUNITY
End: 2023-08-23 | Stop reason: CLARIF

## 2023-08-01 RX ORDER — ONDANSETRON 4 MG/1
4 TABLET, ORALLY DISINTEGRATING ORAL EVERY 4 HOURS PRN
COMMUNITY
Start: 2023-02-22

## 2023-08-01 RX ORDER — ATORVASTATIN CALCIUM 40 MG/1
40 TABLET, FILM COATED ORAL NIGHTLY
COMMUNITY
Start: 2023-04-27

## 2023-08-01 RX ORDER — NAPROXEN SODIUM 220 MG
220 TABLET ORAL
COMMUNITY
End: 2023-08-23 | Stop reason: CLARIF

## 2023-08-01 RX ORDER — PSEUDOEPHEDRINE HCL 30 MG
TABLET ORAL
COMMUNITY

## 2023-08-01 RX ORDER — DICLOFENAC SODIUM 75 MG/1
75 TABLET, DELAYED RELEASE ORAL 2 TIMES DAILY
COMMUNITY
Start: 2023-07-14 | End: 2023-08-23 | Stop reason: SDUPTHER

## 2023-08-01 RX ORDER — DIAZEPAM 10 MG/1
10 TABLET ORAL NIGHTLY
Qty: 30 TABLET | Refills: 0 | Status: CANCELLED | OUTPATIENT
Start: 2023-08-01 | End: 2023-08-31

## 2023-08-01 RX ORDER — NAPROXEN 500 MG/1
500 TABLET ORAL
COMMUNITY
End: 2023-08-23 | Stop reason: CLARIF

## 2023-08-01 RX ORDER — CYCLOBENZAPRINE HCL 10 MG
10 TABLET ORAL
COMMUNITY
Start: 2023-03-12

## 2023-08-01 RX ORDER — ERGOCALCIFEROL (VITAMIN D2) 50 MCG
100 CAPSULE ORAL
COMMUNITY
Start: 2023-04-26

## 2023-08-01 RX ORDER — METRONIDAZOLE 500 MG/1
TABLET ORAL
COMMUNITY

## 2023-08-01 NOTE — PROGRESS NOTES
Subjective:       Patient ID: Neil Chatman is a 73 y.o. male.    Chief Complaint: Follow-up (6 month) and Medication Refill      Frail  AWV HPI :-    Diet and Nutrition: healthy diet    Fracture Risk: no history of fractures; no recent explained fracture; no sudden unexplained fractures; previous musculoskeletal injuries    Physical Activity: doesnt exercise on a regular basis; recent dec. in physical activity;   v poor physical condition    Depression Risk: Occasionally feels sad, empty, or tearful; no loss of interest in activities; no significant changes in weight; no sleep disturbances or insomnia; no agitation; + loss of energy; no feelings of worthlessness or guilt; no thoughts of suicide; no history of depression; no history of mood disorders    Orientation:+ disorientation to time; + disorientation to date; + disorientation to place    Concentration and Memory: + decreased concentrating ability; + memory lapses / loss; he forgets words    Speech/Motor difficulties: no speech difficulties; no difficulty expressing formulated concepts; + difficulty with fine manipulative tasks; + difficulty writing/copying; + slowed reaction time; knocks things over when trying to pick them up    Hearing: Dec. hearing    Vision: no vision problems    Activities of Daily Living: Not able to bathe with limited or no assistance; not able to control urination and bowels; not able to dress with limited or no assistance; not able to feed self with limited or no assistance; not able to get out of chair or bed width limited or no assistance; not able to groom with limited or no assistance; not able to toilet with limited or no assistance  Instrumental Activities of Daily Living: not able to do house work with limited or no assistance; not able to grocery shop with limited or no assistance; not able to manage medications with limited or no assistance; not able to manage money with limited or no assistance; not able to prepare meals  with limited or no assistance; not able to use the phone with limited or no assistance    Falls Risknot Assessment: no frequent falls while walking :n/a .. no fall in the past year; no fall since last visit; no dizziness/vertigo    Home Safety: no unsafe sarina hazards; no unsafe stairs; no unsafe gas appliances; working smoke/CO detectors; wears protective head gear for biking/high velocity; use of seat belts; practicing 'safer sex'; no vision or hearing loss while driving; no fire arms; has hand bars in the bathroom/shower; good lighting in the home        Follow-up    Medication Refill      Review of Systems Frail ROS :-    Constitutional: No fever, no night sweats, no significant weight gain, no significant weight loss, no exercise intolerance.    Eyes: No dry eyes, no irritation, no vision change.    ENMT:    Ears: No difficulty hearing, no ear pain    Nose: No frequent nosebleeds, no nose/sinus problems    Mouth/throat: No sore throat, no bleeding gums, no snoring, no dry mouth, no mouth ulcers, no oral abnormalities, no teeth problems    Cardiovascular: No chest pain, no arm pain on exertion, no shortness of breath when walking, no shortness of breath when lying down, no palpitations, no known heart murmur    Respiratory: No cough, no wheezing, no shortness of breath, no coughing up blood    Gastrointestinal: No abdominal pain, no vomiting, normal appetite, no diarrhea, not vomiting blood.    Genitourinary: + incontinence, + difficulty urinating, + hematuria, + increased frequency.    Musculoskeletal: + muscle aches, + muscle weakness, + arthralgias/joint pain, no back pain, + swelling in extremities.    Skin: No abnormal mole, no jaundice, no rashes.    Neurologic: No loss of consciousness, + weakness, + numbness, no seizures, no dizziness, no headaches.    Psychiatric: Occasional depression, no sleep disturbances, feeling safe in the relationship, no alcohol abuse.    Endocrine: +  fatigue.    Hematologic/lymphatic: No swollen glands, no bruising.    Allergic/immunologic: No runny nose, no sinus pressure, no itching or hives, no frequent sneezing.    Review for Opioid Screening: Pt does not have Rx for Opioids (If patient has Rx list here)      Review for Substance Use Disorders: Patient does not use substance (If patient has Rx list here)             Objective:      Physical Exam      Constitutional:  General appearance: A white gentleman sitting in no acute distress  Level of distress: : NAD   Ambulation: ambulate.with assistance............. uses a walker.      Head: Normocephalic, atraumatic.    ENMT    Oropharynx: Moist mucous membranes, no erythema, no exudates  Neck: Supple, trachea midline, no masses,FROM      Lungs  Respiratory effort: Poor  Auscultation: Breath sounds normal, poor air movement, CTA except as noted, no wheezing or rales/crackles, few ronchi    Cardiovascular:  Heart auscultation:RRR, normal S1, normal S2, no murmurs, no rubs, no gallops.  Neck vessels: No JVD, no carotid bruits      Abdominal examination.  Bowel sounds:normal  Inspection and palpation: Soft, nondistended, no tenderness, no guarding, no rebound tenderness, no masses, no CVA tenderness.        Musculoskeletal.  Deferred due to wheelchair status    Neurologic.  Gait: Tested secondary to electric scooter status    Creased monofilament sensation in upper and lower extremities      Skin.  Inspection and palpation: No rash, no lesions, no ulcers, no abnormal nevi, no induration, no nodules, good turgor, no jaundice.    Back: Deferred.    Assessment:       1. Encounter for annual wellness visit (AWV) in Medicare patient  Overview:  See below    Assessment & Plan:  C-scope : 3 -4 years ago by Dr Troy in [place]  Pls get report  I recommedned the shingrix , Tdap and COVID booster  [place]d [place] [facility]                 Plan:       1. Encounter for annual wellness visit (AWV) in Medicare  patient  Overview:  See below    Assessment & Plan:  C-scope : 3 -4 years ago by Dr Troy in [place]  Pls get report  I recommedned the shingrix , Tdap and COVID booster  I'll order abd US if not done at [facility]    I offered to discuss end of life issues, including information on how to make advance directives that the patient could use to name someone who would make medical decisions on their behalf if they became too ill to make themselves.      __Patient declined       _X__Patient is interested, I provided paperwork and offered to discuss

## 2023-08-01 NOTE — PROGRESS NOTES
Population Health Chart Review & Patient Outreach Details:     Reason for Outreach Encounter:     [x]  Non-Compliant Report   []  Payor Report (Humana, PHN, BCBS, MSSP, MCIP, C, etc.)   []  Pre-Visit Chart Review     Updates Requested / Reviewed:     []  Care Everywhere    []     []  External Sources ([facility], Quest, DIS, etc.)   []  Care Team Updated    Patient Outreach Method:    []  Telephone Outreach Completed   [] Successful   [] Left Voicemail   [] Unable to Contact (wrong number, no voicemail)  []  [facility] Portal Outreach Sent  []  Letter Outreach Mailed  [x]  Fax Sent for External Records  []  External Records Upload    Health Maintenance Topics Addressed and Outreach Outcomes / Actions Taken:        []      Breast Cancer Screening []  Mammo Scheduled      []  External Records Requested     []  Added Reminder to Complete to Upcoming Primary Care Appt Notes     []  Patient Declined     []  Patient Will Call Back to Schedule     []  Patient Will Schedule with External Provider / Order Routed if Applicable             []       Cervical Cancer Screening []  Pap Scheduled      []  External Records Requested     []  Added Reminder to Complete to Upcoming Primary Care Appt Notes     []  Patient Declined     []  Patient Will Call Back to Schedule     []  Patient Will Schedule with External Provider               [x]          Colorectal Cancer Screening []  Colonoscopy Case Request or Referral Placed     [x]  External Records Requested     []  Added Reminder to Complete to Upcoming Primary Care Appt Notes     []  Patient Declined     []  Patient Will Call Back to Schedule     []  Patient Will Schedule with External Provider     []  Fit Kit Mailed (add the SmartPhrase under additional notes)     []  Reminded Patient to Complete Home Test             []      Diabetic Eye Exam []  Eye Camera Scheduled or Optometry Referral Placed     []  External Records Requested     []  Added Reminder to Complete to  Upcoming Primary Care Appt Notes     []  Patient Declined     []  Patient Will Call Back to Schedule     []  Patient Will Schedule with External Provider             []      Blood Pressure Control []  Primary Care Follow Up Visit Scheduled     []  Remote Blood Pressure Reading Captured     []  Added Reminder to Complete to Upcoming Primary Care Appt Notes     []  Patient Declined     []  Patient Will Call Back / Patient Will Send Portal Message with Reading     []  Patient Will Call Back to Schedule Provider Visit             []       HbA1c & Other Labs []  Lab Appt Scheduled for Due Labs     []  Primary Care Follow Up Visit Scheduled      []  Reminded Patient to Complete Home Test     []  Added Reminder to Complete to Upcoming Primary Care Appt Notes     []  Patient Declined     []  Patient Will Call Back to Schedule     []  Patient Will Schedule with External Provider / Order Routed if Applicable           []    Schedule Primary Care Appt []  Primary Care Appt Scheduled     []  Patient Declined     []  Patient Will Call Back to Schedule     []  Pt Established with External Provider & Updated Care Team             []      Medication Adherence []  Primary Care Appointment Scheduled     []  Added Reminder to Upcoming Primary Care Appt Notes     []  Patient Reminded to  Prescription     []  Patient Declined, Provider Notified if Needed     []  Sent Provider Message to Review and/or Add Exclusion to Problem List             []      Osteoporosis Screening []  DXA Appointment Scheduled     []  External Records Requested     []  Added Reminder to Complete to Upcoming Primary Care Appt Notes     []  Patient Declined     []  Patient Will Call Back to Schedule     []  Patient Will Schedule with External Provider / Order Routed if Applicable     Additional Care Coordinator Notes:     08/01/2023  efax sent to dr bernheim for most recent colonoscopy results    Further Action Needed If Patient Returns Outreach:

## 2023-08-01 NOTE — LETTER
FAX      AUTHORIZATION FOR RELEASE OF   CONFIDENTIAL INFORMATION    Dr Bernheim      We are seeing Neil Chatman, date of birth 1950, in the clinic at [facility]. Jose Perez MD is the patient's PCP. Neil Chatman has an outstanding lab/procedure at the time we reviewed their chart. In order to help keep their health information updated, Neil Chatman has authorized us to request the following medical record(s):         COLONOSCOPY- (MOST RECENT WITHIN 10 YRS) need procedure report, pathology report, & 1st post operative visit provider notes.    Pt had before [facility]        Please fax records to Jose Perez MD at [facility] [phone]     If you have any questions, please contact Adenike at [phone].    Adenike Siddiqui LPN  Performance Improvement Coordinator for Population Health  [facility]       Patient Name: Neil Chatman  : 1950  Patient Phone #: [phone]

## 2023-08-01 NOTE — Clinical Note
C-scope : 3 -4 years ago by Dr Troy in [place] Pls get report I recommedned the shingrix , Tdap and COVID booster I'll order abd US if not done at [facility]

## 2023-08-01 NOTE — ASSESSMENT & PLAN NOTE
C-scope : 3 -4 years ago by Dr Troy in [place]  Pls get report  I recommedned the shingrix , Tdap and COVID booster  I'll order abd US if not done at [facility]

## 2023-08-02 ENCOUNTER — PATIENT OUTREACH (OUTPATIENT)
Dept: ADMINISTRATIVE | Facility: HOSPITAL | Age: 73
End: 2023-08-02
Payer: MEDICARE

## 2023-08-14 RX ORDER — NIRMATRELVIR AND RITONAVIR 300-100 MG
KIT ORAL
Qty: 30 TABLET | Refills: 0 | Status: SHIPPED | OUTPATIENT
Start: 2023-08-14 | End: 2023-08-19

## 2023-08-15 ENCOUNTER — TELEPHONE (OUTPATIENT)
Dept: FAMILY MEDICINE | Facility: CLINIC | Age: 73
End: 2023-08-15

## 2023-08-15 NOTE — TELEPHONE ENCOUNTER
----- Message from Ronaldo Harp sent at 8/14/2023 11:14 AM CDT -----  Type: Needs Medical Advice  Who Called:  pt  Pharmacy name and phone #:    [facility] #[id number] - [place], [place] - [address] AT [facility] [address]  [address]  [place] [address]  Phone: [phone] Fax: [phone]    Best Call Back Number: [phone]  Additional Information: pt is calling the office to see if Paxlovid can be called in for him. He tested positive for COVID over the weekend. Please call back to advise. Thanks!

## 2023-08-18 ENCOUNTER — TELEPHONE (OUTPATIENT)
Dept: FAMILY MEDICINE | Facility: CLINIC | Age: 73
End: 2023-08-18
Payer: MEDICARE

## 2023-08-18 NOTE — TELEPHONE ENCOUNTER
----- Message from Reji Ashton sent at 8/18/2023  9:13 AM CDT -----  Type: Patient Call Back         Who called:Pt          What is the request in detail: Pt called in regarding a few questing and concerns          Can the clinic reply by [facility]?no          Would the patient rather a call back or a response via [facility]? Call back          Best call back number:(ppznyv). [phone]         Additional Information:           Thank You

## 2023-08-18 NOTE — TELEPHONE ENCOUNTER
----- Message from Cynthia Somers sent at 8/18/2023 11:20 AM CDT -----  Contact: PT  Type:  Needs Medical Advice    Who Called: PT   Would the patient rather a call back or a response via [facility]? CALL   Best Call Back Number: [phone] (home)   Additional Information: RE: A COMPLICATED RX ISSUE   THANK YOU

## 2023-08-23 ENCOUNTER — TELEPHONE (OUTPATIENT)
Dept: FAMILY MEDICINE | Facility: CLINIC | Age: 73
End: 2023-08-23

## 2023-08-23 RX ORDER — DICLOFENAC SODIUM 75 MG/1
75 TABLET, DELAYED RELEASE ORAL 2 TIMES DAILY PRN
Qty: 60 TABLET | Refills: 2 | Status: SHIPPED | OUTPATIENT
Start: 2023-08-23 | End: 2023-11-10 | Stop reason: SDUPTHER

## 2023-08-23 NOTE — TELEPHONE ENCOUNTER
----- Message from Shreyanely Clarke sent at 8/23/2023 12:30 PM CDT -----  Contact: pt  Type:  RX Refill Request    Who Called:   pt  Refill or New Rx:  refill  RX Name and Strength:  diclofenac (VOLTAREN) 75 MG EC tablet  How is the patient currently taking it? (ex. 1XDay):  as ordered  Is this a 30 day or 90 day RX:  90  Preferred Pharmacy with phone number:    [facility] #[id number] - [place], [place] - [address] AT [facility] [address]  [address]  [place] [address]  Phone: [phone] Fax: [phone]      Local or Mail Order:  local  Ordering Provider:  chuy Kohler Call Back Number:  [phone]    Additional Information:

## 2023-11-03 RX ORDER — SOTALOL HYDROCHLORIDE 80 MG/1
80 TABLET ORAL 2 TIMES DAILY
Qty: 180 TABLET | Refills: 3 | OUTPATIENT
Start: 2023-11-03 | End: 2024-11-02

## 2023-11-03 NOTE — TELEPHONE ENCOUNTER
Outpatient Medication Detail     Disp Refills Start End ZULMA   sotaloL (BETAPACE) 80 MG tablet 180 tablet 3 6/28/2023 6/27/2024 No   Sig - Route: Take 1 tablet (80 mg total) by mouth 2 (two) times daily. 1 PO QD - Oral   Sent to pharmacy as: sotaloL (BETAPACE) 80 MG tablet   Class: Normal   Order: [id number]   Date/Time Signed: 6/28/2023 18:42       E-Prescribing Status: Sent to pharmacy (6/28/2023  6:43 PM CDT)     Pharmacy    [facility] #[id number] - [place], [place] - [address] [place] [facility] [address]           See above, this patient had refill of his medication for an entire year by PCP a few months ago.  Refill requests declined

## 2023-11-03 NOTE — TELEPHONE ENCOUNTER
Leoncio Kelly,  Please review this Rx refill request for this patient of Dr. Napier in his absence.  Last office visit: 8/01/2023  Thank you.  Signed:  Eileen Martinez LPN  ----- Message from Cynthia Somers sent at 11/3/2023  1:13 PM CDT -----  Contact: PT  Type:  RX Refill Request    Who Called: CALL   Refill or New Rx:REFILL   RX Name and Strength:sotaloL (BETAPACE) 80 MG tablet  How is the patient currently taking it? (ex. 1XDay):ONE TAB TWICE A DAY  Is this a 30 day or 90 day RX:90  Preferred Pharmacy with phone number:   [facility] #[id number] - [place], [place] - [address] AT [facility] [address]  [address]  [place] [address]  Phone: [phone] Fax: [phone]  Local or Mail Order:LOCAL  Ordering Provider:TYLER  Would the patient rather a call back or a response via [facility]? CALL   Best Call Back Number:[phone] (home)   Additional Information: THANK YOU

## 2023-11-06 PROBLEM — Z00.00 ENCOUNTER FOR ANNUAL WELLNESS VISIT (AWV) IN MEDICARE PATIENT: Status: RESOLVED | Noted: 2023-08-01 | Resolved: 2023-11-06

## 2023-11-10 RX ORDER — DICLOFENAC SODIUM 75 MG/1
75 TABLET, DELAYED RELEASE ORAL 2 TIMES DAILY PRN
Qty: 60 TABLET | Refills: 2 | Status: SHIPPED | OUTPATIENT
Start: 2023-11-10 | End: 2024-02-08

## 2023-11-28 ENCOUNTER — TELEPHONE (OUTPATIENT)
Dept: FAMILY MEDICINE | Facility: CLINIC | Age: 73
End: 2023-11-28
Payer: MEDICARE

## 2023-11-28 DIAGNOSIS — M54.40 ACUTE BACK PAIN WITH SCIATICA, UNSPECIFIED LATERALITY: Primary | ICD-10-CM

## 2023-11-28 RX ORDER — METHYLPREDNISOLONE 4 MG/1
TABLET ORAL
Qty: 21 EACH | Refills: 0 | Status: SHIPPED | OUTPATIENT
Start: 2023-11-28 | End: 2023-12-19

## 2023-12-18 DIAGNOSIS — C61 MALIGNANT NEOPLASM OF PROSTATE: Primary | ICD-10-CM

## 2023-12-28 ENCOUNTER — PATIENT MESSAGE (OUTPATIENT)
Dept: FAMILY MEDICINE | Facility: CLINIC | Age: 73
End: 2023-12-28
Payer: MEDICARE

## 2024-01-03 ENCOUNTER — TELEPHONE (OUTPATIENT)
Dept: FAMILY MEDICINE | Facility: CLINIC | Age: 74
End: 2024-01-03
Payer: MEDICARE

## 2024-01-03 NOTE — TELEPHONE ENCOUNTER
----- Message from Cynthia Somers sent at 1/2/2024 11:06 AM CST -----  Contact: PT  Type:  Needs Medical Advice    Who Called: PT   Symptoms (please be specific): PLEASE FWD LAB RESULTS FOR SERUM PSA AND SERUM TESTOSTERONE  TO DR. HOLLOWAY, [facility]  FAX # [phone]  Would the patient rather a call back or a response via [facility]? CALL   Best Call Back Number: [phone] (home)   Additional Information: THANK YOU

## 2024-01-04 RX ORDER — FLUTICASONE PROPIONATE AND SALMETEROL XINAFOATE 115; 21 UG/1; UG/1
2 AEROSOL, METERED RESPIRATORY (INHALATION) EVERY 12 HOURS
Qty: 12 G | Refills: 2 | Status: SHIPPED | OUTPATIENT
Start: 2024-01-04 | End: 2024-04-03

## 2024-01-04 RX ORDER — FLUTICASONE PROPIONATE AND SALMETEROL XINAFOATE 115; 21 UG/1; UG/1
2 AEROSOL, METERED RESPIRATORY (INHALATION) EVERY 12 HOURS
COMMUNITY
End: 2024-01-04 | Stop reason: SDUPTHER

## 2024-03-30 RX ORDER — METHYLPREDNISOLONE 4 MG/1
TABLET ORAL
Qty: 21 EACH | Refills: 0 | Status: SHIPPED | OUTPATIENT
Start: 2024-03-30 | End: 2024-04-20

## 2024-04-01 ENCOUNTER — TELEPHONE (OUTPATIENT)
Dept: FAMILY MEDICINE | Facility: CLINIC | Age: 74
End: 2024-04-01
Payer: MEDICARE

## 2024-04-01 NOTE — TELEPHONE ENCOUNTER
----- Message from Aylinrod Candy sent at 4/1/2024 12:51 PM CDT -----  Regarding: Refills  RX Refill Request      Who Called:  Pt     Refill or New Rx: Refills     RX Name and Strength: diazePAM (VALIUM) 10 MG Tab     How is the patient currently taking it? (ex. 1XDay): 1 x day as needed     Is this a 30 day or 90 day RX: 30    Preferred Pharmacy with phone number:   [facility] #[id number] - [place], [place] - [address] AT [facility] [address]  [address]  [place] [address]  Phone: [phone] Fax: [phone]        Local or Mail Order: Local     Ordering Provider: Puneet    Would the patient rather a call back or a response via [facility]?  Call back    Best Call Back Number: [phone]      Additional Information:  Please Advise - Thank you

## 2024-04-02 ENCOUNTER — OFFICE VISIT (OUTPATIENT)
Dept: FAMILY MEDICINE | Facility: CLINIC | Age: 74
End: 2024-04-02
Payer: MEDICARE

## 2024-04-02 VITALS
HEART RATE: 78 BPM | DIASTOLIC BLOOD PRESSURE: 59 MMHG | HEIGHT: 69 IN | WEIGHT: 238.19 LBS | SYSTOLIC BLOOD PRESSURE: 106 MMHG | OXYGEN SATURATION: 100 % | BODY MASS INDEX: 35.28 KG/M2

## 2024-04-02 DIAGNOSIS — R54 FRAIL ELDERLY: ICD-10-CM

## 2024-04-02 DIAGNOSIS — G89.29 CHRONIC MIDLINE LOW BACK PAIN WITHOUT SCIATICA: ICD-10-CM

## 2024-04-02 DIAGNOSIS — M54.50 CHRONIC MIDLINE LOW BACK PAIN WITHOUT SCIATICA: ICD-10-CM

## 2024-04-02 DIAGNOSIS — R73.9 ELEVATED BLOOD SUGAR: ICD-10-CM

## 2024-04-02 DIAGNOSIS — I50.9 HEART FAILURE, UNSPECIFIED HF CHRONICITY, UNSPECIFIED HEART FAILURE TYPE: Primary | ICD-10-CM

## 2024-04-02 DIAGNOSIS — E78.2 MIXED HYPERLIPIDEMIA: ICD-10-CM

## 2024-04-02 DIAGNOSIS — I48.0 AF (PAROXYSMAL ATRIAL FIBRILLATION): ICD-10-CM

## 2024-04-02 DIAGNOSIS — I42.0 DILATED CARDIOMYOPATHY: ICD-10-CM

## 2024-04-02 DIAGNOSIS — C61 PROSTATE CA: ICD-10-CM

## 2024-04-02 DIAGNOSIS — I10 ESSENTIAL HYPERTENSION, BENIGN: ICD-10-CM

## 2024-04-02 DIAGNOSIS — I50.22 CHRONIC SYSTOLIC HEART FAILURE: ICD-10-CM

## 2024-04-02 DIAGNOSIS — I42.0 CONGESTIVE CARDIOMYOPATHY: ICD-10-CM

## 2024-04-02 PROBLEM — E66.01 MORBID OBESITY: Status: RESOLVED | Noted: 2023-03-20 | Resolved: 2024-04-02

## 2024-04-02 PROCEDURE — G2211 COMPLEX E/M VISIT ADD ON: HCPCS | Mod: S$GLB,,, | Performed by: INTERNAL MEDICINE

## 2024-04-02 PROCEDURE — 99214 OFFICE O/P EST MOD 30 MIN: CPT | Mod: S$GLB,,, | Performed by: INTERNAL MEDICINE

## 2024-04-02 NOTE — PROGRESS NOTES
Subjective:       Patient ID: Neil Chatman is a 73 y.o. male.    Chief Complaint: Follow-up (Follow up ) and Low-back Pain      Patient is established already .......... presents today for a f/u visit , to discuss upcoming  labs and for management of the chronic conditions kevin LBP        Follow-up  This is a chronic problem. The current episode started more than 1 year ago. The problem occurs intermittently. The problem has been waxing and waning. Associated symptoms include joint swelling and myalgias. Pertinent negatives include no fever. The symptoms are aggravated by bending, standing and walking. He has tried acetaminophen, heat, relaxation, rest and sleep (Steroids) for the symptoms. The treatment provided moderate relief.   Low-back Pain  Associated symptoms include joint swelling and myalgias. Pertinent negatives include no fever.     Review of Systems   Constitutional:  Negative for activity change, appetite change and fever.   Respiratory: Negative.     Cardiovascular: Negative.    Gastrointestinal: Negative.    Musculoskeletal:  Positive for back pain, joint swelling and myalgias.         Objective:      Physical Exam  Vitals and nursing note reviewed.   Constitutional:       Appearance: He is obese.      Comments: Walks with assistance of a cane   Cardiovascular:      Rate and Rhythm: Normal rate and regular rhythm.      Pulses: Normal pulses.      Heart sounds: Normal heart sounds. No murmur heard.     No friction rub. No gallop.   Pulmonary:      Effort: Pulmonary effort is normal.      Breath sounds: Normal breath sounds. No wheezing.   Musculoskeletal:      Right lower leg: Edema present.      Left lower leg: Edema present.   Skin:     General: Skin is warm and dry.   Neurological:      General: No focal deficit present.      Mental Status: He is alert.   Psychiatric:         Mood and Affect: Mood normal.         Assessment:       1. Heart failure, unspecified HF chronicity, unspecified heart failure  type    2. Paroxysmal atrial fibrillation    3. Dilated cardiomyopathy    4. Essential hypertension, benign  -     Comprehensive Metabolic Panel; Future; Expected date: 04/02/2024  -     Microalbumin/Creatinine Ratio, Urine; Future; Expected date: 04/02/2024    5. Mixed hyperlipidemia  -     Lipid Panel; Future; Expected date: 04/02/2024    6. Elevated blood sugar  -     Hemoglobin A1C; Future; Expected date: 04/02/2024    7. Chronic midline low back pain without sciatica  Overview:  Improved after a Rx for solumedrol    Assessment & Plan:  Monitor  Tylenol  Heating applic        8. Prostate CA  Overview:  Stable    Assessment & Plan:  Defer to Dr Cesar Borja's meds have changed  Get updated reports from Dr Guerrero      9. Frail elderly  Overview:  Sec to prostate CA  Chronic back problems  Ambulates with cane    Assessment & Plan:  Monitor with Uro  Falling precautions             Plan:       1. Heart failure, unspecified HF chronicity, unspecified heart failure type    2. Paroxysmal atrial fibrillation    3. Dilated cardiomyopathy    4. Essential hypertension, benign  -     Comprehensive Metabolic Panel; Future; Expected date: 04/02/2024  -     Microalbumin/Creatinine Ratio, Urine; Future; Expected date: 04/02/2024    5. Mixed hyperlipidemia  -     Lipid Panel; Future; Expected date: 04/02/2024    6. Elevated blood sugar  -     Hemoglobin A1C; Future; Expected date: 04/02/2024    7. Chronic midline low back pain without sciatica  Overview:  Improved after a Rx for solumedrol    Assessment & Plan:  Monitor  Tylenol  Heating applic        8. Prostate CA  Overview:  Stable    Assessment & Plan:  Defer to Dr Cesar Borja's meds have changed  Get updated reports from Dr Guerrero      9. Frail elderly  Overview:  Sec to prostate CA  Chronic back problems  Ambulates with cane    Assessment & Plan:  Monitor with Uro  Falling precautions          1. Heart failure, unspecified HF chronicity, unspecified heart failure  type    2. AF (paroxysmal atrial fibrillation)  Overview:  Stable  Not on an anticoagulant    Assessment & Plan:  Referred back to Dr James from Card  ? anticoagulation      3. Dilated cardiomyopathy    4. Essential hypertension, benign  -     Comprehensive Metabolic Panel; Future; Expected date: 04/02/2024  -     Microalbumin/Creatinine Ratio, Urine; Future; Expected date: 04/02/2024    5. Mixed hyperlipidemia  -     Lipid Panel; Future; Expected date: 04/02/2024    6. Elevated blood sugar  -     Hemoglobin A1C; Future; Expected date: 04/02/2024    7. Chronic midline low back pain without sciatica  Overview:  Improved after a Rx for solumedrol    Assessment & Plan:  Monitor  Tylenol  Heating applic        8. Prostate CA  Overview:  Stable    Assessment & Plan:  Defer to Dr Guerrero  He's meds have changed  Get updated reports from Dr Guerrero      9. Frail elderly  Overview:  Sec to prostate CA  Chronic back problems  Ambulates with cane    Assessment & Plan:  Monitor with Uro  Falling precautions        Visit today included increased complexity associated with the care of the episodic problem    addressed and managing the longitudinal care of the patient due to the serious and/or complex managed problem(s)   .      I spent a total of 31 minutes on the day of the visit.  This includes face to face time and non-face to face time preparing to see the patient (eg, review of tests), obtaining and/or reviewing separately obtained history, documenting clinical information in the electronic or other health record, independently interpreting results and communicating results to the patient/family/caregiver, or care coordinator.

## 2024-04-04 ENCOUNTER — TELEPHONE (OUTPATIENT)
Dept: FAMILY MEDICINE | Facility: CLINIC | Age: 74
End: 2024-04-04
Payer: MEDICARE

## 2024-04-04 NOTE — TELEPHONE ENCOUNTER
----- Message from Lisette Art sent at 4/4/2024  7:31 AM CDT -----  Contact: Self  Type:  Patient Returning Call    Who Called:  Self  Who Left Message for Patient:  nurse  Does the patient know what this is regarding?:  his msg bc his rx was not filled  Best Call Back Number:   [phone]  Additional Information:  Please call the patient back at the phone number listed above to advise. Thank you!

## 2024-04-12 ENCOUNTER — TELEPHONE (OUTPATIENT)
Dept: FAMILY MEDICINE | Facility: CLINIC | Age: 74
End: 2024-04-12
Payer: MEDICARE

## 2024-04-12 NOTE — TELEPHONE ENCOUNTER
----- Message from Jose Perez MD sent at 3/17/2024  6:14 PM CDT -----  Regarding: Meds not covered  Hi : please schedule a f/u visit for annual physical, refills and to discuss insurance denial of his inhaler  Ty

## 2024-05-28 DIAGNOSIS — M62.830 BACK MUSCLE SPASM: Primary | ICD-10-CM

## 2024-05-28 RX ORDER — CYCLOBENZAPRINE HCL 10 MG
10 TABLET ORAL 3 TIMES DAILY PRN
Qty: 90 TABLET | Refills: 2 | Status: SHIPPED | OUTPATIENT
Start: 2024-05-28 | End: 2024-08-26

## 2024-05-29 RX ORDER — LISINOPRIL 5 MG/1
5 TABLET ORAL DAILY
Qty: 90 TABLET | Refills: 3 | Status: SHIPPED | OUTPATIENT
Start: 2024-05-29 | End: 2025-05-29

## 2024-05-29 NOTE — TELEPHONE ENCOUNTER
Leoncio Napier,  Please review Rx refill request from this patient for Lisinopril 5 mg.  Patient asked me to tell you thank for the refill on the Flexeril 10 mg.  Last office visit: 4/2/2024  Thank you.  Signed:  Eileen Martinez LPN  ----- Message from Jose Perez MD sent at 5/28/2024  5:47 PM CDT -----  Regarding: Refill  Contact: PT  Pls tell him I refilled his Flexeril  Thanks  ----- Message -----  From: Edwina Marley LPN  Sent: 5/28/2024   3:13 PM CDT  To: Jose Perez MD      ----- Message -----  From: Cynthia Somers  Sent: 5/28/2024   3:12 PM CDT  To: Puneet Garcia Staff    Type:  RX Refill Request    Who Called: PT   Refill or New Rx:REFILL   RX Name and Strength: cyclobenzaprine (FLEXERIL) 10 MG tablet  How is the patient currently taking it? (ex. 1XDay):ONE TAB 3 TIMES A DAY AS NEEDED   Is this a 30 day or 90 day RX: 30  Preferred Pharmacy with phone number:  [id number] - [place], [place] - [address] AT [facility] [address]  [address]  [place] [address]  Phone: [phone] Fax: [phone]  Local or Mail Order:LOCAL   Ordering Provider:PUNEET  Would the patient rather a call back or a response via [facility]? CALL   Best Call Back Number:[phone] (home)   Additional Information: PT REQUEST REFILLS   THANK YOU

## 2024-06-11 ENCOUNTER — TELEPHONE (OUTPATIENT)
Dept: FAMILY MEDICINE | Facility: CLINIC | Age: 74
End: 2024-06-11
Payer: MEDICARE

## 2024-06-11 RX ORDER — METHYLPREDNISOLONE 4 MG/1
TABLET ORAL
Qty: 21 EACH | Refills: 0 | Status: SHIPPED | OUTPATIENT
Start: 2024-06-11 | End: 2024-07-02

## 2024-06-11 NOTE — TELEPHONE ENCOUNTER
----- Message from Jose Perez MD sent at 6/11/2024 12:16 PM CDT -----  Regarding: Medrol dose pack  Contact: PT  OK tell him I called it  Ty  ----- Message -----  From: Christen Christian MA  Sent: 6/11/2024   9:54 AM CDT  To: Jose Perez MD    Patient Requesting medication refill.  ----- Message -----  From: Cynthia Somers  Sent: 6/11/2024   9:43 AM CDT  To: Puneet Garcia Staff    Type:  RX Refill Request    Who Called: PT   Refill or New Rx: NEW   RX Name and Strength: MEDROL DOSE PACK   How is the patient currently taking it? (ex. 1XDay): AS DIRECTED   Is this a 30 day or 90 day RX: 30  Preferred Pharmacy with phone number:   [id number] - [place], [place] - [address] AT [facility] [address]  [address]  [place] [address]  Phone: [phone] Fax: [phone]  Local or Mail Order:LOCAL   Ordering Provider: PUNEET  Would the patient rather a call back or a response via [facility]? CALL   Best Call Back Number:[phone] (home)  Additional Information: PT IS REQUESTING THIS RX WITH REFILLS FOR HIS BACK PAIN   THANK YOU

## 2024-07-08 RX ORDER — VENLAFAXINE HYDROCHLORIDE 150 MG/1
150 CAPSULE, EXTENDED RELEASE ORAL
Qty: 90 CAPSULE | Refills: 0 | Status: SHIPPED | OUTPATIENT
Start: 2024-07-08

## 2024-07-08 NOTE — TELEPHONE ENCOUNTER
Refill Decision Note   Neil Chatman  is requesting a refill authorization.  Brief Assessment and Rationale for Refill:  Approve     Medication Therapy Plan:  FLOS 7/25/24      Comments:     Note composed:2:01 PM 07/08/2024

## 2024-07-08 NOTE — TELEPHONE ENCOUNTER
Care Due:                  Date            Visit Type   Department     Provider  --------------------------------------------------------------------------------                                SAME DAY -                              ESTABLISHED   [facility] FAM[facility]  Last Visit: 04-      PATIENT      MEDICINE       Jose Perez  Next Visit: None Scheduled  None         None Found                                                            Last  Test          Frequency    Reason                     Performed    Due Date  --------------------------------------------------------------------------------    CMP.........  12 months..  lisinopriL, venlafaxine..  07- 07-    [facility] Embedded Care Due Messages. Reference number: [id number].   7/08/2024 1:59:20 PM CDT

## 2024-07-30 ENCOUNTER — LAB VISIT (OUTPATIENT)
Dept: LAB | Facility: CLINIC | Age: 74
End: 2024-07-30
Payer: MEDICARE

## 2024-07-30 DIAGNOSIS — C61 MALIGNANT NEOPLASM OF PROSTATE: ICD-10-CM

## 2024-07-30 DIAGNOSIS — E78.2 MIXED HYPERLIPIDEMIA: ICD-10-CM

## 2024-07-30 DIAGNOSIS — I10 ESSENTIAL HYPERTENSION, BENIGN: ICD-10-CM

## 2024-07-30 DIAGNOSIS — R73.9 ELEVATED BLOOD SUGAR: ICD-10-CM

## 2024-07-30 LAB
ALBUMIN SERPL BCP-MCNC: 3.1 G/DL (ref 3.5–5.2)
ALP SERPL-CCNC: 203 U/L (ref 55–135)
ALT SERPL W/O P-5'-P-CCNC: 20 U/L (ref 10–44)
ANION GAP SERPL CALC-SCNC: 7 MMOL/L (ref 8–16)
AST SERPL-CCNC: 21 U/L (ref 10–40)
BILIRUB SERPL-MCNC: 0.5 MG/DL (ref 0.1–1)
BUN SERPL-MCNC: 13 MG/DL (ref 8–23)
CALCIUM SERPL-MCNC: 9.5 MG/DL (ref 8.7–10.5)
CHLORIDE SERPL-SCNC: 108 MMOL/L (ref 95–110)
CHOLEST SERPL-MCNC: 189 MG/DL (ref 120–199)
CHOLEST/HDLC SERPL: 3.7 {RATIO} (ref 2–5)
CO2 SERPL-SCNC: 27 MMOL/L (ref 23–29)
COMPLEXED PSA SERPL-MCNC: 0.19 NG/ML (ref 0–4)
CREAT SERPL-MCNC: 0.8 MG/DL (ref 0.5–1.4)
EST. GFR  (NO RACE VARIABLE): >60 ML/MIN/1.73 M^2
ESTIMATED AVG GLUCOSE: 100 MG/DL (ref 68–131)
GLUCOSE SERPL-MCNC: 87 MG/DL (ref 70–110)
HBA1C MFR BLD: 5.1 % (ref 4–5.6)
HDLC SERPL-MCNC: 51 MG/DL (ref 40–75)
HDLC SERPL: 27 % (ref 20–50)
LDLC SERPL CALC-MCNC: 116.6 MG/DL (ref 63–159)
NONHDLC SERPL-MCNC: 138 MG/DL
POTASSIUM SERPL-SCNC: 4.4 MMOL/L (ref 3.5–5.1)
PROT SERPL-MCNC: 6 G/DL (ref 6–8.4)
SODIUM SERPL-SCNC: 142 MMOL/L (ref 136–145)
TESTOST SERPL-MCNC: 15 NG/DL (ref 304–1227)
TRIGL SERPL-MCNC: 107 MG/DL (ref 30–150)

## 2024-07-30 PROCEDURE — 36415 COLL VENOUS BLD VENIPUNCTURE: CPT | Mod: ,,, | Performed by: INTERNAL MEDICINE

## 2024-07-30 PROCEDURE — 84403 ASSAY OF TOTAL TESTOSTERONE: CPT | Performed by: INTERNAL MEDICINE

## 2024-07-30 PROCEDURE — 80061 LIPID PANEL: CPT | Performed by: INTERNAL MEDICINE

## 2024-07-30 PROCEDURE — 84153 ASSAY OF PSA TOTAL: CPT | Performed by: INTERNAL MEDICINE

## 2024-07-30 PROCEDURE — 80053 COMPREHEN METABOLIC PANEL: CPT | Performed by: INTERNAL MEDICINE

## 2024-07-30 PROCEDURE — 83036 HEMOGLOBIN GLYCOSYLATED A1C: CPT | Performed by: INTERNAL MEDICINE

## 2024-08-02 ENCOUNTER — OFFICE VISIT (OUTPATIENT)
Dept: FAMILY MEDICINE | Facility: CLINIC | Age: 74
End: 2024-08-02
Payer: MEDICARE

## 2024-08-02 VITALS
SYSTOLIC BLOOD PRESSURE: 130 MMHG | RESPIRATION RATE: 22 BRPM | BODY MASS INDEX: 36.14 KG/M2 | HEART RATE: 83 BPM | WEIGHT: 244 LBS | DIASTOLIC BLOOD PRESSURE: 70 MMHG | HEIGHT: 69 IN | OXYGEN SATURATION: 97 %

## 2024-08-02 DIAGNOSIS — Z00.00 ENCOUNTER FOR ANNUAL WELLNESS VISIT (AWV) IN MEDICARE PATIENT: Primary | ICD-10-CM

## 2024-08-02 DIAGNOSIS — R54 FRAIL ELDERLY: ICD-10-CM

## 2024-08-02 DIAGNOSIS — M25.552 BILATERAL HIP PAIN: ICD-10-CM

## 2024-08-02 DIAGNOSIS — M25.551 BILATERAL HIP PAIN: ICD-10-CM

## 2024-08-02 DIAGNOSIS — E78.2 MIXED HYPERLIPIDEMIA: ICD-10-CM

## 2024-08-02 PROCEDURE — G0439 PPPS, SUBSEQ VISIT: HCPCS | Mod: S$GLB,,, | Performed by: INTERNAL MEDICINE

## 2024-08-02 PROCEDURE — 99213 OFFICE O/P EST LOW 20 MIN: CPT | Mod: 25,S$GLB,, | Performed by: INTERNAL MEDICINE

## 2024-08-02 PROCEDURE — 99497 ADVNCD CARE PLAN 30 MIN: CPT | Mod: 33,S$GLB,, | Performed by: INTERNAL MEDICINE

## 2024-08-02 RX ORDER — LISINOPRIL 5 MG/1
1 TABLET ORAL DAILY
COMMUNITY

## 2024-08-02 RX ORDER — CLINDAMYCIN HYDROCHLORIDE 300 MG/1
1 CAPSULE ORAL 3 TIMES DAILY
COMMUNITY

## 2024-08-02 RX ORDER — NAPROXEN 250 MG/1
TABLET ORAL
COMMUNITY

## 2024-08-02 RX ORDER — FUROSEMIDE 40 MG/1
40 TABLET ORAL
COMMUNITY

## 2024-08-02 RX ORDER — METHYLPREDNISOLONE 4 MG/1
TABLET ORAL
Qty: 21 EACH | Refills: 2 | Status: SHIPPED | OUTPATIENT
Start: 2024-08-02 | End: 2024-08-23

## 2024-08-02 RX ORDER — SOTALOL HYDROCHLORIDE 80 MG/1
1 TABLET ORAL 2 TIMES DAILY
COMMUNITY

## 2024-08-02 RX ORDER — SIMVASTATIN 40 MG/1
1 TABLET, FILM COATED ORAL DAILY
COMMUNITY

## 2024-08-02 RX ORDER — ASPIRIN 325 MG
325 TABLET ORAL
COMMUNITY

## 2024-08-02 RX ORDER — SODIUM FLUORIDE 5 MG/G
GEL, DENTIFRICE DENTAL
COMMUNITY

## 2024-08-04 PROBLEM — M25.551 BILATERAL HIP PAIN: Status: ACTIVE | Noted: 2024-08-04

## 2024-08-04 PROBLEM — M25.552 BILATERAL HIP PAIN: Status: ACTIVE | Noted: 2024-08-04

## 2024-10-08 RX ORDER — VENLAFAXINE HYDROCHLORIDE 150 MG/1
150 CAPSULE, EXTENDED RELEASE ORAL DAILY
Qty: 90 CAPSULE | Refills: 3 | Status: SHIPPED | OUTPATIENT
Start: 2024-10-08

## 2024-10-08 NOTE — TELEPHONE ENCOUNTER
Refill Decision Note   Neil Lisandroemily  is requesting a refill authorization.  Brief Assessment and Rationale for Refill:  Approve     Medication Therapy Plan:        Comments:     Note composed:1:32 PM 10/08/2024

## 2024-10-08 NOTE — TELEPHONE ENCOUNTER
No care due was identified.  [facility] Embedded Care Due Messages. Reference number: [id number].   10/08/2024 12:57:34 PM CDT

## 2024-10-08 NOTE — TELEPHONE ENCOUNTER
Last office visit: 8/2/2024  Upcoming Appointment: 2/23/2025  ----- Message from Shreya sent at 10/8/2024 12:53 PM CDT -----  Contact: pt  Type:  RX Refill Request    Who Called:  pt  Refill or New Rx:  refill  RX Name and Strength:  venlafaxine (EFFEXOR-XR) 150 MG Cp24   How is the patient currently taking it? (ex. 1XDay):  as ordered  Is this a 30 day or 90 day RX:  90  Preferred Pharmacy with phone number:    [facility] #[id number] - [place], [place] - [address] AT [facility] [address]  [address]  [place] [address]  Phone: [phone] Fax: [phone]      Local or Mail Order:  local  Ordering Provider:  Karthikeyan Kohler Call Back Number:  [phone]  Additional Information:  pt only has 1 tablet left.

## 2024-10-14 ENCOUNTER — TELEPHONE (OUTPATIENT)
Dept: FAMILY MEDICINE | Facility: CLINIC | Age: 74
End: 2024-10-14
Payer: MEDICARE

## 2024-10-14 RX ORDER — SOTALOL HYDROCHLORIDE 80 MG/1
80 TABLET ORAL 2 TIMES DAILY
Qty: 180 TABLET | Refills: 1 | Status: SHIPPED | OUTPATIENT
Start: 2024-10-14 | End: 2025-04-12

## 2024-10-14 NOTE — TELEPHONE ENCOUNTER
----- Message from Celestine sent at 10/14/2024 12:28 PM CDT -----  Regarding: refill  Contact: patient  Type:  RX Refill Request    Who Called: patient  Refill or New Rx:refill  RX Name and Strength:sotaloL (BETAPACE) 80 MG tablet  How is the patient currently taking it? (ex. 1XDay):  Is this a 30 day or 90 day RX:  Preferred Pharmacy with phone number:  [facility] #[id number] - [place], [place] - [address] AT [facility] [address]  [address]  [place] [address]  Phone: [phone] Fax: [phone]  Local or Mail Order:local  Ordering Provider:Puneet  Would the patient rather a call back or a response via [facility]? refill  Best Call Back Number:[phone]  Additional Information:

## 2024-10-14 NOTE — TELEPHONE ENCOUNTER
Patient is requesting a refill on SotaloL. Patient hasn't been taking this medication for a while. LOV:8/2/24. NOV:2/3/25. Does patient need an appointment?

## 2024-11-08 ENCOUNTER — TELEPHONE (OUTPATIENT)
Dept: FAMILY MEDICINE | Facility: CLINIC | Age: 74
End: 2024-11-08

## 2024-11-08 RX ORDER — FLUOCINONIDE CREAM (EMULSIFIED BASE) 0.5 MG/G
CREAM TOPICAL 2 TIMES DAILY
Qty: 30 G | Refills: 2 | Status: SHIPPED | OUTPATIENT
Start: 2024-11-08 | End: 2025-02-06

## 2024-11-08 NOTE — TELEPHONE ENCOUNTER
Last OV 8/2/24 and last refill on Fluocinodie-emollient was 6/30/23.     Appointment offered, patient denied.

## 2024-11-08 NOTE — TELEPHONE ENCOUNTER
----- Message from Shreya sent at 11/8/2024  9:40 AM CST -----  Contact: PT  Type:  RX Refill Request    Who Called:  pt  Refill or New Rx:   refill  RX Name and Strength:   fluocinonide-emollient (FLUOCINONIDE-E) 0.05 % Cream   How is the patient currently taking it? (ex. 1XDay):  as ordered  Is this a 30 day or 90 day RX:  30 gram tube  Preferred Pharmacy with phone number:    [facility] #[id number] - [place], [place] - [address] AT [facility] [address]  [address]  [place] [address]  Phone: [phone] Fax: [phone]      Local or Mail Order:  local  Ordering Provider:  bernadette Kohler Call Back Number:  [phone]  Additional Information:

## 2024-11-18 ENCOUNTER — TELEPHONE (OUTPATIENT)
Dept: FAMILY MEDICINE | Facility: CLINIC | Age: 74
End: 2024-11-18

## 2024-11-18 DIAGNOSIS — M54.40 CHRONIC BILATERAL LOW BACK PAIN WITH SCIATICA, SCIATICA LATERALITY UNSPECIFIED: Primary | ICD-10-CM

## 2024-11-18 DIAGNOSIS — G89.29 CHRONIC BILATERAL LOW BACK PAIN WITH SCIATICA, SCIATICA LATERALITY UNSPECIFIED: Primary | ICD-10-CM

## 2024-11-18 RX ORDER — METHYLPREDNISOLONE 4 MG/1
TABLET ORAL
Qty: 21 EACH | Refills: 0 | Status: SHIPPED | OUTPATIENT
Start: 2024-11-18 | End: 2024-12-09

## 2024-11-18 NOTE — TELEPHONE ENCOUNTER
Patient asked  to come In for visit, he refused.     Please refills medrol Dose link    Last  OV 8/24

## 2024-11-18 NOTE — TELEPHONE ENCOUNTER
----- Message from Rhina sent at 11/18/2024  2:39 PM CST -----  Contact: PT  Type: Needs Medical Advice  Who Called:  PT  Symptoms (please be specific):  Sciatic nerve  How long has patient had these symptoms:  on going  Pharmacy name and phone #:    [facility] #[id number] - [place], [place] - [address] AT [facility] [address]  [address]  [place] [address]  Phone: [phone] Fax: [phone]    Best Call Back Number: [phone]  Additional Information: PT  asking to get prescription for  Medrol dose pack

## 2024-11-19 ENCOUNTER — OFFICE VISIT (OUTPATIENT)
Dept: FAMILY MEDICINE | Facility: CLINIC | Age: 74
End: 2024-11-19
Payer: MEDICARE

## 2024-11-19 DIAGNOSIS — M51.16 LUMBAR DISC DISEASE WITH RADICULOPATHY: ICD-10-CM

## 2024-11-19 DIAGNOSIS — I50.43 ACUTE ON CHRONIC COMBINED SYSTOLIC (CONGESTIVE) AND DIASTOLIC (CONGESTIVE) HEART FAILURE: Primary | ICD-10-CM

## 2024-11-19 NOTE — PROGRESS NOTES
Subjective:       Patient ID: Neil Chatman is a 74 y.o. male.    Chief Complaint: No chief complaint on file.      The patient location is: [place]  The chief complaint leading to consultation is: low back pains  Visit type: audiovisual  Face to Face time with patient: 25  15 minutes of total time spent on the encounter, which includes face to face time and non-face to face time preparing to see the patient (eg, review of tests), Obtaining and/or reviewing separately obtained history, Docum clinical info in the electronic or other health record, Indep interpreting results and communicating results to patient                    Back Pain  This is a chronic problem. The current episode started more than 1 year ago. The problem occurs intermittently. The problem has been gradually worsening since onset. The pain is present in the lumbar spine. The quality of the pain is described as shooting. The pain radiates to the right thigh. The pain is at a severity of 7/10. The pain is severe. The pain is The same all the time. The symptoms are aggravated by bending, position, standing and twisting. Stiffness is present In the morning. Pertinent negatives include no fever. Risk factors include obesity, lack of exercise, poor posture and sedentary lifestyle. Treatments tried: See MAR. The treatment provided mild relief.     Review of Systems   Constitutional:  Negative for activity change, appetite change and fever.   Respiratory: Negative.     Cardiovascular: Negative.    Gastrointestinal: Negative.    Musculoskeletal:  Positive for back pain.         Objective:      Physical Exam   deferred b/o telehealth  Assessment:       1. Acute on chronic combined systolic (congestive) and diastolic (congestive) heart failure  Assessment & Plan:  Stable  Compensated  F/u with Card  Cont prn lasix, ace inh , statin  Cont sotalol      2. Lumbar disc disease with radiculopathy  Overview:  With recent exacerbation    Assessment & Plan:  Refer to Dr FULTON  Ramiro  Medrol dose pack    Orders:  -     Ambulatory referral/consult to Orthopedics; Future; Expected date: 11/26/2024           Plan:       1. Acute on chronic combined systolic (congestive) and diastolic (congestive) heart failure  Assessment & Plan:  Stable  Compensated  F/u with Card  Cont prn lasix, ace inh , statin  Cont sotalol      2. Lumbar disc disease with radiculopathy  Overview:  With recent exacerbation    Assessment & Plan:  Refer to Dr KIRSTIN Greenwood dose pack    Orders:  -     Ambulatory referral/consult to Orthopedics; Future; Expected date: 11/26/2024        Visit today included increased complexity associated with the care of the episodic problem.   I addressed and managing the longitudinal care of the patient due to the serious and/or complex managed problem(s).  .      I spent a total of 20 minutes on the day of the visit.  This includes face to face time and non-face to face time preparing to see the patient (eg, review of tests), obtaining and/or reviewing separately obtained history, documenting clinical information in the electronic or other health record, independently interpreting results and communicating results to the patient/family/caregiver, or care coordinator.

## 2024-11-21 ENCOUNTER — TELEPHONE (OUTPATIENT)
Dept: FAMILY MEDICINE | Facility: CLINIC | Age: 74
End: 2024-11-21
Payer: MEDICARE

## 2024-11-21 NOTE — TELEPHONE ENCOUNTER
----- Message from Cynthia sent at 11/21/2024 10:26 AM CST -----  Contact: PT  Type:  Needs Medical Advice    Who Called: PT   Symptoms (please be specific): PT IS STILL WAITING TO HEAR FROM EDITH LAKHANI RE: SURGICAL CONSULTATION    PLEASE CONFIRM THAT THEY WERE CONTACTED   Would the patient rather a call back or a response via [facility]? CALL   Best Call Back Number: [phone]  Additional Information: THANK YOU

## 2024-12-17 RX ORDER — OMEPRAZOLE 20 MG/1
20 CAPSULE, DELAYED RELEASE ORAL DAILY
Qty: 90 CAPSULE | Refills: 3 | Status: SHIPPED | OUTPATIENT
Start: 2024-12-17 | End: 2024-12-18 | Stop reason: SDUPTHER

## 2024-12-17 NOTE — TELEPHONE ENCOUNTER
No care due was identified.  [facility] Embedded Care Due Messages. Reference number: [id number].   12/17/2024 2:33:18 PM CST

## 2024-12-18 RX ORDER — OMEPRAZOLE 20 MG/1
20 CAPSULE, DELAYED RELEASE ORAL DAILY PRN
Qty: 90 CAPSULE | Refills: 1 | Status: SHIPPED | OUTPATIENT
Start: 2024-12-18 | End: 2025-06-16

## 2024-12-18 NOTE — TELEPHONE ENCOUNTER
Refill Decision Note   Neli Lisandroemily  is requesting a refill authorization.  Brief Assessment and Rationale for Refill:  Approve     Medication Therapy Plan:        Comments:     Note composed:9:16 PM 12/17/2024

## 2025-01-10 DIAGNOSIS — M54.30 SCIATICA, UNSPECIFIED LATERALITY: Primary | ICD-10-CM

## 2025-01-10 RX ORDER — METHYLPREDNISOLONE 4 MG/1
TABLET ORAL
Qty: 21 EACH | Refills: 0 | Status: SHIPPED | OUTPATIENT
Start: 2025-01-10 | End: 2025-01-31

## 2025-02-04 ENCOUNTER — LAB VISIT (OUTPATIENT)
Dept: LAB | Facility: CLINIC | Age: 75
End: 2025-02-04
Payer: MEDICARE

## 2025-02-04 ENCOUNTER — OFFICE VISIT (OUTPATIENT)
Dept: FAMILY MEDICINE | Facility: CLINIC | Age: 75
End: 2025-02-04
Payer: MEDICARE

## 2025-02-04 VITALS
BODY MASS INDEX: 36.02 KG/M2 | HEART RATE: 92 BPM | SYSTOLIC BLOOD PRESSURE: 136 MMHG | DIASTOLIC BLOOD PRESSURE: 68 MMHG | OXYGEN SATURATION: 98 % | HEIGHT: 69 IN | WEIGHT: 243.19 LBS

## 2025-02-04 DIAGNOSIS — D68.9 COAGULOPATHY: ICD-10-CM

## 2025-02-04 DIAGNOSIS — Z98.890 H/O LAMINECTOMY: ICD-10-CM

## 2025-02-04 DIAGNOSIS — M54.30 SCIATIC LEG PAIN: ICD-10-CM

## 2025-02-04 DIAGNOSIS — Z01.818 PREOP EXAM FOR INTERNAL MEDICINE: ICD-10-CM

## 2025-02-04 DIAGNOSIS — Z01.818 PREOPERATIVE CLEARANCE: Primary | ICD-10-CM

## 2025-02-04 DIAGNOSIS — Z01.818 PREOPERATIVE CLEARANCE: ICD-10-CM

## 2025-02-04 DIAGNOSIS — I70.213 ATHEROSCLEROSIS OF NATIVE ARTERY OF BOTH LOWER EXTREMITIES WITH INTERMITTENT CLAUDICATION: ICD-10-CM

## 2025-02-04 PROBLEM — I50.22 CHRONIC SYSTOLIC HEART FAILURE: Status: RESOLVED | Noted: 2024-04-02 | Resolved: 2025-02-04

## 2025-02-04 PROBLEM — I42.0 CONGESTIVE CARDIOMYOPATHY: Status: RESOLVED | Noted: 2023-03-20 | Resolved: 2025-02-04

## 2025-02-04 PROBLEM — I50.9 HEART FAILURE, UNSPECIFIED HF CHRONICITY, UNSPECIFIED HEART FAILURE TYPE: Status: RESOLVED | Noted: 2024-04-02 | Resolved: 2025-02-04

## 2025-02-04 PROBLEM — I50.43 ACUTE ON CHRONIC COMBINED SYSTOLIC (CONGESTIVE) AND DIASTOLIC (CONGESTIVE) HEART FAILURE: Status: RESOLVED | Noted: 2024-11-19 | Resolved: 2025-02-04

## 2025-02-04 LAB
APTT PPP: 27.9 SEC (ref 21–32)
BILIRUBIN, UA POC OHS: NEGATIVE
BLOOD, UA POC OHS: NEGATIVE
CLARITY, UA POC OHS: CLEAR
COLOR, UA POC OHS: YELLOW
GLUCOSE, UA POC OHS: NEGATIVE
INR PPP: 1 (ref 0.8–1.2)
KETONES, UA POC OHS: NEGATIVE
LEUKOCYTES, UA POC OHS: NEGATIVE
NITRITE, UA POC OHS: NEGATIVE
PH, UA POC OHS: 6.5
PROTEIN, UA POC OHS: NEGATIVE
PROTHROMBIN TIME: 10.9 SEC (ref 9–12.5)
SPECIFIC GRAVITY, UA POC OHS: 1.02
UROBILINOGEN, UA POC OHS: 0.2

## 2025-02-04 PROCEDURE — 93010 ELECTROCARDIOGRAM REPORT: CPT | Mod: S$GLB,,, | Performed by: INTERNAL MEDICINE

## 2025-02-04 PROCEDURE — 85730 THROMBOPLASTIN TIME PARTIAL: CPT | Performed by: INTERNAL MEDICINE

## 2025-02-04 PROCEDURE — 81003 URINALYSIS AUTO W/O SCOPE: CPT | Mod: QW,S$GLB,, | Performed by: INTERNAL MEDICINE

## 2025-02-04 PROCEDURE — 93005 ELECTROCARDIOGRAM TRACING: CPT | Mod: S$GLB,,, | Performed by: INTERNAL MEDICINE

## 2025-02-04 PROCEDURE — 99214 OFFICE O/P EST MOD 30 MIN: CPT | Mod: 25,S$GLB,, | Performed by: INTERNAL MEDICINE

## 2025-02-04 PROCEDURE — 85610 PROTHROMBIN TIME: CPT | Performed by: INTERNAL MEDICINE

## 2025-02-04 PROCEDURE — 36415 COLL VENOUS BLD VENIPUNCTURE: CPT | Mod: ,,, | Performed by: INTERNAL MEDICINE

## 2025-02-04 RX ORDER — METHYLPREDNISOLONE 4 MG/1
TABLET ORAL
Qty: 21 EACH | Refills: 0 | Status: SHIPPED | OUTPATIENT
Start: 2025-02-04 | End: 2025-02-25

## 2025-02-04 NOTE — ASSESSMENT & PLAN NOTE
Obtain clearance from Pulmonary and Cardiology  Order chest x-ray  EKG done today and showed a paced heart rhythm  Get urinalysis coagulation profile and MRSA  Obtain results of CBC and CMP from [facility]

## 2025-02-04 NOTE — PROGRESS NOTES
"Subjective:       Patient ID: Neil Chatman is a 74 y.o. male.    Chief Complaint: Surgery Clearance       History of Present Illness    CHIEF COMPLAINT:  Neil presents for a follow-up visit to discuss clearance from specialists.    HPI:  Neil has been evaluated and cleared by both a cardiologist and a pulmonologist for an unspecified procedure or treatment. He is frustrated with the healthcare system due to the inconvenience of attending in-person appointments for clearances already obtained from specialists. Neil reports ongoing issues with his urologist's office related to misidentification of his personal information. He has been incorrectly identified as "Mr. Goodwin" twice and most recently as "Mr. Beaver" in communications from the urology office.         Review of Systems   Constitutional:  Negative for activity change, appetite change and fever.   Respiratory: Negative.     Cardiovascular: Negative.    Gastrointestinal: Negative.    Musculoskeletal:  Positive for back pain, gait problem and leg pain.          Objective:      Physical Exam  Vitals and nursing note reviewed.   Constitutional:       Appearance: Normal appearance. He is obese.      Comments: Seems older than chronological age. Walks with difficulty and uses a walker   Cardiovascular:      Rate and Rhythm: Normal rate and regular rhythm.      Pulses: Normal pulses.      Heart sounds: Normal heart sounds. No murmur heard.     No friction rub. No gallop.   Pulmonary:      Effort: Pulmonary effort is normal.      Breath sounds: Normal breath sounds. No wheezing.   Musculoskeletal:      Right lower leg: Edema present.      Left lower leg: Edema present.   Skin:     General: Skin is warm and dry.   Neurological:      Mental Status: He is alert.      Comments: Deferred   Psychiatric:         Mood and Affect: Mood normal.         Assessment:       1. Preoperative clearance  -     IN OFFICE EKG 12-LEAD (to Muse)  -     X-Ray Chest PA And Lateral; Future; " Expected date: 02/04/2025  -     Protime-INR; Future; Expected date: 02/04/2025  -     APTT; Future; Expected date: 02/04/2025  -     POCT Urinalysis(Instrument)    2. H/O laminectomy  -     Protime-INR; Future; Expected date: 02/04/2025    3. Coagulopathy  -     APTT; Future; Expected date: 02/04/2025    4. Preop exam for internal medicine  Overview:  Patient is scheduled for a laminectomy on March 5    Assessment & Plan:  Obtain clearance from Pulmonary and Cardiology  Order chest x-ray  EKG done today and showed a paced heart rhythm  Get urinalysis coagulation profile and MRSA  Obtain results of CBC and CMP from [facility]      5. Sciatic leg pain  Overview:  With waxing and waning pains    Assessment & Plan:  Patient is scheduled for a laminectomy next month  Refilled his Medrol Dosepak for pain relief      6. Atherosclerosis of native artery of both lower extremities with intermittent claudication  Assessment & Plan:  Stable  Monitor   F/u with Card      Other orders  -     methylPREDNISolone (MEDROL DOSEPACK) 4 mg tablet; use as directed  Dispense: 21 each; Refill: 0           Plan:       Assessment & Plan    IMPRESSION:  - Awaiting clearance from cardiologist and pulmonologist before proceeding with treatment plan  - Considering potential misidentification issues with urologist's office (Dr. Bulmaro Guerrero) and its impact on patient care    HEART FAILURE, UNSPECIFIED HF CHRONICITY, UNSPECIFIED HEART FAILURE TYPE:  - Neil has been cleared by both a cardiologist and pulmonologist.  - Follow-up appointment scheduled after receiving cardiologist clearance.    FOLLOW UP:  - A follow-up visit has been scheduled following the pulmonologist's clearance.  - Acknowledged the inconvenience of this health services encounter for the patient, which was likely for clearance or follow-up purposes.       Neil was seen today for surgery clearance .    Diagnoses and all orders for this visit:    Preoperative clearance  -      IN OFFICE EKG 12-LEAD (to Muse)  -     X-Ray Chest PA And Lateral; Future  -     Protime-INR; Future  -     APTT; Future  -     POCT Urinalysis(Instrument)    H/O laminectomy  -     Protime-INR; Future    Coagulopathy  -     APTT; Future    Preop exam for internal medicine    Sciatic leg pain    Atherosclerosis of native artery of both lower extremities with intermittent claudication    Other orders  -     methylPREDNISolone (MEDROL DOSEPACK) 4 mg tablet; use as directed

## 2025-02-05 LAB
OHS QRS DURATION: 140 MS
OHS QTC CALCULATION: 455 MS

## 2025-02-10 RX ORDER — LISINOPRIL 5 MG/1
5 TABLET ORAL DAILY
Qty: 90 TABLET | Refills: 1 | Status: SHIPPED | OUTPATIENT
Start: 2025-02-10 | End: 2025-08-09

## 2025-03-05 ENCOUNTER — TELEPHONE (OUTPATIENT)
Dept: FAMILY MEDICINE | Facility: CLINIC | Age: 75
End: 2025-03-05
Payer: MEDICARE

## 2025-03-05 NOTE — TELEPHONE ENCOUNTER
----- Message from Rhina sent at 3/5/2025  2:14 PM CST -----  Contact: Cammie  Type: Needs Medical AdviceWho Called: Cammie/ Dr. Calle Call Back Number: [phone], FAX# [phone]  Information: Requesting to get a copy of pt most recent Chest Xray, scheduled for surgery on tmrw.Please Advise- Thank you

## 2025-04-28 ENCOUNTER — TELEPHONE (OUTPATIENT)
Dept: FAMILY MEDICINE | Facility: CLINIC | Age: 75
End: 2025-04-28
Payer: MEDICARE

## 2025-04-28 NOTE — TELEPHONE ENCOUNTER
I spoke with pt and let him know Urology is requesting labs so he would have to f/u with urology about any labs needed.

## 2025-04-28 NOTE — TELEPHONE ENCOUNTER
----- Message from Jose Perez MD sent at 4/28/2025  9:07 AM CDT -----  Regarding: Labs  Please call Neil regarding labs that he needs to be done in [place] before he sees his urologistAsk the patient to call the urologist office to send those orders to whichever lab he wantsAnd schedule him for a follow up visit with me next monthAnd let me know pleaseThank you

## 2025-04-28 NOTE — TELEPHONE ENCOUNTER
I called pt to inform that doctor wants a f/u in a month, pt requested orders be sent to facility in [place] where he  wants labs to be done.

## 2025-04-28 NOTE — TELEPHONE ENCOUNTER
----- Message from Jose Perez MD sent at 4/28/2025  9:07 AM CDT -----  Regarding: Labs  Please call Neil regarding labs that he needs to be done in [place] before he sees his urologistAsk the patient to call the urologist office to send those orders to whichever lab he wantsAnd schedule him for a follow up visit with me next monthAnd let me know pleaseThank you

## 2025-05-08 NOTE — TELEPHONE ENCOUNTER
Care Due:                  Date            Visit Type   Department     Provider  --------------------------------------------------------------------------------                                EP -                              PRIMARY      [facility] FAM[facility]  Last Visit: 02-      CARE ([facility])   MEDICINE       Jose Perez  Next Visit: None Scheduled  None         None Found                                                            Last  Test          Frequency    Reason                     Performed    Due Date  --------------------------------------------------------------------------------    CMP.........  12 months..  lisinopriL, venlafaxine..  07- 07-    Health [facility] Embedded Care Due Messages. Reference number: [id number].   5/08/2025 4:38:59 PM CDT

## 2025-05-08 NOTE — TELEPHONE ENCOUNTER
Leoncio Duff,  Please review this Rx refill request for this patient of Dr. Napier's in his absence this week.  Last office visit: 2/4/2025  Thank you  Eileen Martinez LPN  ----- Message from Dulce sent at 5/8/2025  4:29 PM CDT -----  Contact: Patient  Type:  RX Refill RequestWho Called: PatientRefill or New Rx:refill RX Name and Strength:ondansetron (ZOFRAN-ODT) 4 MG TbDLHow is the patient currently taking it? (ex. 1XDay): as needed Is this a 30 day or 90 day RX:30Preferred Pharmacy with phone number:[id number] - [place] - [address] AT [facility] [id number] [place] [place] [address]: [phone] Fax: [phone] or Mail Order:Local Ordering Provider:Karthikeyan Would the patient rather a call back or a response via [facility]? Call Best Call Back Number:[phone] Information: Please refill

## 2025-05-09 RX ORDER — ONDANSETRON 4 MG/1
4 TABLET, ORALLY DISINTEGRATING ORAL EVERY 4 HOURS PRN
Qty: 30 TABLET | Refills: 1 | Status: SHIPPED | OUTPATIENT
Start: 2025-05-09